# Patient Record
Sex: MALE | Race: BLACK OR AFRICAN AMERICAN | NOT HISPANIC OR LATINO | Employment: OTHER | URBAN - METROPOLITAN AREA
[De-identification: names, ages, dates, MRNs, and addresses within clinical notes are randomized per-mention and may not be internally consistent; named-entity substitution may affect disease eponyms.]

---

## 2023-05-31 ENCOUNTER — HOSPITAL ENCOUNTER (EMERGENCY)
Facility: HOSPITAL | Age: 23
Discharge: HOME | End: 2023-05-31
Attending: INTERNAL MEDICINE

## 2023-05-31 VITALS
OXYGEN SATURATION: 99 % | HEIGHT: 71 IN | BODY MASS INDEX: 28.73 KG/M2 | DIASTOLIC BLOOD PRESSURE: 88 MMHG | RESPIRATION RATE: 16 BRPM | TEMPERATURE: 99 F | HEART RATE: 98 BPM | SYSTOLIC BLOOD PRESSURE: 127 MMHG | WEIGHT: 205.25 LBS

## 2023-05-31 DIAGNOSIS — R19.7 DIARRHEA, UNSPECIFIED TYPE: ICD-10-CM

## 2023-05-31 DIAGNOSIS — R06.02 SHORTNESS OF BREATH: ICD-10-CM

## 2023-05-31 DIAGNOSIS — R10.9 ABDOMINAL PAIN, UNSPECIFIED ABDOMINAL LOCATION: ICD-10-CM

## 2023-05-31 DIAGNOSIS — R11.2 NAUSEA AND VOMITING, UNSPECIFIED VOMITING TYPE: Primary | ICD-10-CM

## 2023-05-31 LAB
ALBUMIN SERPL-MCNC: 4.5 G/DL (ref 3.5–5)
ALBUMIN/GLOB SERPL: 1.3 RATIO (ref 1.1–2)
ALP SERPL-CCNC: 56 UNIT/L (ref 40–150)
ALT SERPL-CCNC: 32 UNIT/L (ref 0–55)
AST SERPL-CCNC: 44 UNIT/L (ref 5–34)
BASOPHILS # BLD AUTO: 0.03 X10(3)/MCL
BASOPHILS NFR BLD AUTO: 0.5 %
BILIRUBIN DIRECT+TOT PNL SERPL-MCNC: 0.5 MG/DL
BUN SERPL-MCNC: 12.6 MG/DL (ref 8.9–20.6)
CALCIUM SERPL-MCNC: 9.6 MG/DL (ref 8.4–10.2)
CHLORIDE SERPL-SCNC: 101 MMOL/L (ref 98–107)
CO2 SERPL-SCNC: 26 MMOL/L (ref 22–29)
CREAT SERPL-MCNC: 1.11 MG/DL (ref 0.73–1.18)
EOSINOPHIL # BLD AUTO: 0.12 X10(3)/MCL (ref 0–0.9)
EOSINOPHIL NFR BLD AUTO: 1.9 %
ERYTHROCYTE [DISTWIDTH] IN BLOOD BY AUTOMATED COUNT: 11.6 % (ref 11.5–17)
GFR SERPLBLD CREATININE-BSD FMLA CKD-EPI: >60 MLS/MIN/1.73/M2
GLOBULIN SER-MCNC: 3.6 GM/DL (ref 2.4–3.5)
GLUCOSE SERPL-MCNC: 96 MG/DL (ref 74–100)
HCT VFR BLD AUTO: 47.5 % (ref 42–52)
HGB BLD-MCNC: 17 G/DL (ref 14–18)
IMM GRANULOCYTES # BLD AUTO: 0.02 X10(3)/MCL (ref 0–0.04)
IMM GRANULOCYTES NFR BLD AUTO: 0.3 %
LIPASE SERPL-CCNC: 6 U/L
LYMPHOCYTES # BLD AUTO: 1.86 X10(3)/MCL (ref 0.6–4.6)
LYMPHOCYTES NFR BLD AUTO: 29.9 %
MCH RBC QN AUTO: 31 PG (ref 27–31)
MCHC RBC AUTO-ENTMCNC: 35.8 G/DL (ref 33–36)
MCV RBC AUTO: 86.7 FL (ref 80–94)
MONOCYTES # BLD AUTO: 0.89 X10(3)/MCL (ref 0.1–1.3)
MONOCYTES NFR BLD AUTO: 14.3 %
NEUTROPHILS # BLD AUTO: 3.3 X10(3)/MCL (ref 2.1–9.2)
NEUTROPHILS NFR BLD AUTO: 53.1 %
NRBC BLD AUTO-RTO: 0 %
PLATELET # BLD AUTO: 205 X10(3)/MCL (ref 130–400)
PMV BLD AUTO: 9.8 FL (ref 7.4–10.4)
POTASSIUM SERPL-SCNC: 3.6 MMOL/L (ref 3.5–5.1)
PROT SERPL-MCNC: 8.1 GM/DL (ref 6.4–8.3)
RBC # BLD AUTO: 5.48 X10(6)/MCL (ref 4.7–6.1)
SODIUM SERPL-SCNC: 138 MMOL/L (ref 136–145)
WBC # SPEC AUTO: 6.22 X10(3)/MCL (ref 4.5–11.5)

## 2023-05-31 PROCEDURE — 99284 EMERGENCY DEPT VISIT MOD MDM: CPT | Mod: 25

## 2023-05-31 PROCEDURE — 25000003 PHARM REV CODE 250: Performed by: PHYSICIAN ASSISTANT

## 2023-05-31 PROCEDURE — 80053 COMPREHEN METABOLIC PANEL: CPT | Performed by: PHYSICIAN ASSISTANT

## 2023-05-31 PROCEDURE — 85025 COMPLETE CBC W/AUTO DIFF WBC: CPT | Performed by: PHYSICIAN ASSISTANT

## 2023-05-31 PROCEDURE — 83690 ASSAY OF LIPASE: CPT | Performed by: PHYSICIAN ASSISTANT

## 2023-05-31 RX ORDER — ONDANSETRON 8 MG/1
8 TABLET, ORALLY DISINTEGRATING ORAL EVERY 8 HOURS PRN
Qty: 12 TABLET | Refills: 0 | Status: SHIPPED | OUTPATIENT
Start: 2023-05-31

## 2023-05-31 RX ORDER — ONDANSETRON 4 MG/1
8 TABLET, ORALLY DISINTEGRATING ORAL
Status: COMPLETED | OUTPATIENT
Start: 2023-05-31 | End: 2023-05-31

## 2023-05-31 RX ADMIN — ONDANSETRON 8 MG: 4 TABLET, ORALLY DISINTEGRATING ORAL at 05:05

## 2023-05-31 NOTE — ED PROVIDER NOTES
Encounter Date: 5/31/2023       History     Chief Complaint   Patient presents with    Shortness of Breath    Cough     Shortness of breath x 4 days with cough today     21 YO AAM in ER with complaints of abdominal cramping, N/V/D (non bloody), weight loss and feeling SOB X 4 days. Denies fever, chills, chest pain,  HA or dizziness. No other complaints.     The history is provided by the patient.   Review of patient's allergies indicates:  No Known Allergies  History reviewed. No pertinent past medical history.  History reviewed. No pertinent surgical history.  History reviewed. No pertinent family history.  Social History     Tobacco Use    Smoking status: Some Days     Types: Cigarettes    Smokeless tobacco: Never   Substance Use Topics    Drug use: Yes     Frequency: 7.0 times per week     Types: Marijuana     Review of Systems   Constitutional:  Positive for appetite change and unexpected weight change. Negative for chills and fever.   HENT:  Negative for congestion and trouble swallowing.    Respiratory:  Positive for shortness of breath. Negative for cough, chest tightness and wheezing.    Cardiovascular:  Negative for chest pain and leg swelling.   Gastrointestinal:  Positive for abdominal pain, diarrhea, nausea and vomiting. Negative for blood in stool.   Musculoskeletal:  Negative for joint swelling.   Skin:  Negative for rash.   Neurological:  Negative for dizziness, weakness and headaches.   All other systems reviewed and are negative.    Physical Exam     Initial Vitals [05/31/23 1627]   BP Pulse Resp Temp SpO2   124/87 103 18 98.8 °F (37.1 °C) 100 %      MAP       --         Physical Exam    Nursing note and vitals reviewed.  Constitutional: He appears well-developed and well-nourished.   HENT:   Head: Normocephalic and atraumatic.   Nose: Nose normal.   Eyes: Conjunctivae are normal.   Neck: Neck supple.   Normal range of motion.  Cardiovascular:  Normal rate and regular rhythm.            Pulmonary/Chest: Breath sounds normal. No respiratory distress. He has no wheezes. He has no rhonchi.   Abdominal: Abdomen is soft and flat. Bowel sounds are increased. There is generalized abdominal tenderness. There is no rebound, no guarding, no tenderness at McBurney's point and negative King's sign.   Musculoskeletal:         General: Normal range of motion.      Cervical back: Normal range of motion and neck supple.     Neurological: He is alert and oriented to person, place, and time.   Skin: Skin is dry.   Psychiatric: He has a normal mood and affect.       ED Course   Procedures  Labs Reviewed   COMPREHENSIVE METABOLIC PANEL - Abnormal; Notable for the following components:       Result Value    Globulin 3.6 (*)     Aspartate Aminotransferase 44 (*)     All other components within normal limits   LIPASE - Normal   CBC W/ AUTO DIFFERENTIAL    Narrative:     The following orders were created for panel order CBC auto differential.  Procedure                               Abnormality         Status                     ---------                               -----------         ------                     CBC with Differential[[id number]]                            Final result                 Please view results for these tests on the individual orders.   CBC WITH DIFFERENTIAL   URINALYSIS, REFLEX TO URINE CULTURE   EXTRA TUBES    Narrative:     The following orders were created for panel order EXTRA TUBES.  Procedure                               Abnormality         Status                     ---------                               -----------         ------                     Light Blue Top Hold[[id number]]                                                         Lavender Top Hold[[id number]]                                                           Gold Top Hold[[id number]]                                                                 Please view results for these tests on the individual orders.   LIGHT  BLUE TOP HOLD   LAVENDER TOP HOLD   GOLD TOP HOLD          Imaging Results              X-Ray Abdomen Flat And Erect (Final result)  Result time 05/31/23 18:32:23      Final result by Nithya Betancourt MD (05/31/23 18:32:23)                   Impression:      No acute abnormality identified.      Electronically signed by: Nithya Betancourt  Date:    05/31/2023  Time:    18:32               Narrative:    EXAMINATION:  XR ABDOMEN FLAT AND ERECT    CLINICAL HISTORY:  Pain, unspecified    COMPARISON:  None.    FINDINGS:  There is no free intraperitoneal air.  The bowel gas pattern is nonobstructive.  The lung bases are clear.                                       X-Ray Chest AP Portable (Final result)  Result time 05/31/23 18:31:57      Final result by Nithya Betancourt MD (05/31/23 18:31:57)                   Impression:      No acute abnormality of the chest.      Electronically signed by: Nithya Betancourt  Date:    05/31/2023  Time:    18:31               Narrative:    EXAMINATION:  XR CHEST AP PORTABLE    CLINICAL HISTORY:  Shortness of breath    COMPARISON:  None    FINDINGS:  The heart is normal in size.  The lungs are clear.  There is no pleural effusion or visible pneumothorax.                                       Medications   ondansetron disintegrating tablet 8 mg (8 mg Oral Given 5/31/23 1720)                 ED Course as of 05/31/23 1842   Wed May 31, 2023   1839 VSS, NAD, pt is non-toxic or ill appearing, labs and imaging reviewed with pt, feeling better after treatment in ER, tolerated PO challenge, will Rx Zofran for home use, return to ER precautions given, treatment plan and discharge instructions including follow up discussed, pt verbalized understanding, all questions answered, pt is stable and ready for discharge  [TT]      ED Course User Index  [TT] LEXI Willams                 Clinical Impression:   Final diagnoses:  [R06.02] Shortness of breath  [R11.2] Nausea and vomiting, unspecified  vomiting type (Primary)  [R19.7] Diarrhea, unspecified type  [R10.9] Abdominal pain, unspecified abdominal location        ED Disposition Condition    Discharge Stable          ED Prescriptions       Medication Sig Dispense Start Date End Date Auth. Provider    ondansetron (ZOFRAN-ODT) 8 MG TbDL Take 1 tablet (8 mg total) by mouth every 8 (eight) hours as needed (nausea). Allow to dissolve on the tongue, do not chew or swallow 12 tablet 5/31/2023 -- LEXI Willams          Follow-up Information       Follow up With Specialties Details Why Contact Info [facility] Emergency Medicine In 3 days As needed, If symptoms worsen [address] [facility] [address] [phone]    Primary Care Provider  Schedule an appointment as soon as possible for a visit in 5 days  Call a PCP to make an appointment for follow up within 3-5  days.  You can call the phone number on the back of your insurance card for accepting providers as discussed.             LEXI Willams  05/31/23 1877

## 2023-05-31 NOTE — DISCHARGE INSTRUCTIONS
Take all medications as prescribed.     Drink plenty of fluids and get a lot of rest.     [place] diet for the next 24 -48 hours then you may advance your diet as tolerated.    Return to ER for any changes or worsening of symptoms.

## 2024-03-25 ENCOUNTER — HOSPITAL ENCOUNTER (EMERGENCY)
Facility: HOSPITAL | Age: 24
Discharge: LEFT BEFORE DISCHARGE | End: 2024-03-25
Payer: COMMERCIAL

## 2024-03-25 VITALS
BODY MASS INDEX: 32.9 KG/M2 | WEIGHT: 235 LBS | TEMPERATURE: 98 F | HEIGHT: 71 IN | DIASTOLIC BLOOD PRESSURE: 78 MMHG | RESPIRATION RATE: 16 BRPM | HEART RATE: 80 BPM | SYSTOLIC BLOOD PRESSURE: 122 MMHG | OXYGEN SATURATION: 98 %

## 2024-03-25 PROCEDURE — 99283 EMERGENCY DEPT VISIT LOW MDM: CPT
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
## 2024-12-05 ENCOUNTER — HOSPITAL ENCOUNTER (EMERGENCY)
Facility: HOSPITAL | Age: 24
Discharge: HOME | End: 2024-12-05
Attending: STUDENT IN AN ORGANIZED HEALTH CARE EDUCATION/TRAINING PROGRAM

## 2024-12-05 VITALS
WEIGHT: 225.06 LBS | BODY MASS INDEX: 32.22 KG/M2 | RESPIRATION RATE: 20 BRPM | TEMPERATURE: 97 F | OXYGEN SATURATION: 99 % | HEART RATE: 90 BPM | HEIGHT: 70 IN | SYSTOLIC BLOOD PRESSURE: 162 MMHG | DIASTOLIC BLOOD PRESSURE: 113 MMHG

## 2024-12-05 DIAGNOSIS — R36.9 PENILE DISCHARGE: Primary | ICD-10-CM

## 2024-12-05 DIAGNOSIS — Z20.2 STD EXPOSURE: ICD-10-CM

## 2024-12-05 PROCEDURE — 96372 THER/PROPH/DIAG INJ SC/IM: CPT

## 2024-12-05 PROCEDURE — 63600175 PHARM REV CODE 636 W HCPCS

## 2024-12-05 PROCEDURE — 99284 EMERGENCY DEPT VISIT MOD MDM: CPT | Mod: 25

## 2024-12-05 RX ORDER — CEFTRIAXONE 500 MG/1
1000 INJECTION, POWDER, FOR SOLUTION INTRAMUSCULAR; INTRAVENOUS
Status: COMPLETED | OUTPATIENT
Start: 2024-12-05 | End: 2024-12-05

## 2024-12-05 RX ORDER — DOXYCYCLINE 100 MG/1
100 CAPSULE ORAL 2 TIMES DAILY
Qty: 14 CAPSULE | Refills: 0 | Status: SHIPPED | OUTPATIENT
Start: 2024-12-05 | End: 2024-12-12

## 2024-12-05 RX ADMIN — CEFTRIAXONE SODIUM 1000 MG: 500 INJECTION, POWDER, FOR SOLUTION INTRAMUSCULAR; INTRAVENOUS at 08:12

## 2024-12-06 NOTE — ED PROVIDER NOTES
Encounter Date: 12/5/2024       History     Chief Complaint   Patient presents with    Penile Discharge     23 y/o male presents to the ED complaining of white to yellow penile discharge for a few days. He is sexually active with several partners, he does not use a barrier method, he was told by one partner she tested positive for chlamydia. He denies burning with urination, abdominal pain, nausea, fever, rashes, testicle pain/swelling. No other complaints    The history is provided by the patient.     Review of patient's allergies indicates:  No Known Allergies  History reviewed. No pertinent past medical history.  History reviewed. No pertinent surgical history.  No family history on file.  Social History     Tobacco Use    Smoking status: Some Days     Types: Cigarettes    Smokeless tobacco: Never   Substance Use Topics    Drug use: Yes     Frequency: 7.0 times per week     Types: Marijuana     Review of Systems   Constitutional: Negative.    HENT: Negative.     Eyes: Negative.    Respiratory: Negative.     Cardiovascular: Negative.    Gastrointestinal: Negative.    Genitourinary:  Positive for penile discharge. Negative for decreased urine volume, difficulty urinating, dysuria, enuresis, flank pain, frequency, genital sores, hematuria, penile pain, penile swelling, scrotal swelling, testicular pain and urgency.        Notified his partner is positive for chlamydia, reports penile discharge yellow to white for a few days. Denies fever, scrotal pain, abdominal pain, dysuria, nausea, vomiting, weakness, fatigue, wounds, rashes   Musculoskeletal: Negative.    Skin: Negative.    Neurological: Negative.        Physical Exam     Initial Vitals [12/05/24 2007]   BP Pulse Resp Temp SpO2   (!) 162/113 90 20 97.2 °F (36.2 °C) 99 %      MAP       --         Physical Exam    Nursing note and vitals reviewed.  Constitutional: He appears well-developed and well-nourished. He is cooperative.  Non-toxic appearance. He does not  have a sickly appearance. He does not appear ill. No distress.   HENT:   Head: Normocephalic and atraumatic.   Right Ear: Hearing, tympanic membrane and external ear normal.   Left Ear: Hearing, tympanic membrane and external ear normal.   Nose: Nose normal. Mouth/Throat: Uvula is midline, oropharynx is clear and moist and mucous membranes are normal.   Eyes: Conjunctivae and EOM are normal. Pupils are equal, round, and reactive to light. No scleral icterus.   Neck: Trachea normal and phonation normal. Neck supple.   Normal range of motion.  Cardiovascular:  Normal rate, regular rhythm, normal heart sounds, intact distal pulses and normal pulses.           Pulmonary/Chest: Effort normal and breath sounds normal. No accessory muscle usage. No apnea, no tachypnea and no bradypnea. No respiratory distress. He has no decreased breath sounds. He has no wheezes. He has no rhonchi. He has no rales. He exhibits no tenderness.   Normal effort, symmetrical chest rise and fall, no accessory muscle use. Bilateral clear breath sounds in all fields anterior and posterior.      Abdominal: Abdomen is soft. Bowel sounds are normal. He exhibits no distension. There is no abdominal tenderness.   Abdomen is soft, nontender, no peritoneal signs. Tolerating PO fluids.      No right CVA tenderness.  No left CVA tenderness. There is no rebound, no guarding, no tenderness at McBurney's point and negative King's sign. negative psoas sign and negative Rovsing's sign  Genitourinary:    Genitourinary Comments: Declined exam     Musculoskeletal:         General: Normal range of motion.      Cervical back: Normal range of motion and neck supple.     Neurological: He is alert and oriented to person, place, and time. He has normal strength. Gait normal. GCS score is 15. GCS eye subscore is 4. GCS verbal subscore is 5. GCS motor subscore is 6.   Skin: Skin is warm and dry. Capillary refill takes less than 2 seconds. No rash noted.   Psychiatric:  He has a normal mood and affect. His speech is normal and behavior is normal. Judgment and thought content normal. Cognition and memory are normal.         ED Course   Procedures  Labs Reviewed - No data to display       Imaging Results    None          Medications   cefTRIAXone injection 1,000 mg (1,000 mg Intramuscular Given 12/5/24 2022)     Medical Decision Making  Urethritis, cystitis, pyelonephritis, urethral stone, syphilis, herpes, malignancy, cellulitis, abscess, among others    Penile discharge for a few days with one partner testing positive for chlamydia. Requesting to be treated for both chlamydia and gonorrhea. Educated to go to the health unit to get tested for HIV, Hepatitis, and Syphilis. Educated on sexual safe practices and barrier methods.    Patient is nontoxic appearing, no acute distress, stable vital signs.   The patient is resting comfortably in no acute distress.  hemodynamically stable and is without objective evidence for acute process requiring urgent intervention or hospitalization. I provided counseling to patient with regard to condition, the treatment plan, specific conditions for return, and the importance of follow up. Detailed written and verbal instructions provided to patient and he expressed a verbal understanding of the discharge instructions and overall management plan. Reiterated the importance of medication administration and safety and advised patient to follow up with primary care provider in 3-5 days or sooner if needed. Answered questions at this time. The patient is stable for discharge.       Risk  Prescription drug management.                                      Clinical Impression:  Final diagnoses:  [R36.9] Penile discharge (Primary)  [Z20.2] STD exposure          ED Disposition Condition    Discharge Stable          ED Prescriptions       Medication Sig Dispense Start Date End Date Auth. Provider    doxycycline (VIBRAMYCIN) 100 MG Cap Take 1 capsule (100 mg  total) by mouth 2 (two) times daily. for 7 days 14 capsule 12/5/2024 12/12/2024 Christal Hatfield FNP          Follow-up Information       Follow up With Specialties Details Why Contact Info    [facility] Emergency Medicine  If symptoms worsen [address] [facility] [address] [phone]    PCP  In 1 week  Follow up with PCP in 3-5 days.             Christal Hatfield FNP  12/05/24 2031

## 2025-01-11 ENCOUNTER — HOSPITAL ENCOUNTER (EMERGENCY)
Facility: HOSPITAL | Age: 25
Discharge: HOME | End: 2025-01-11
Attending: EMERGENCY MEDICINE

## 2025-01-11 VITALS
BODY MASS INDEX: 31.52 KG/M2 | TEMPERATURE: 98 F | HEIGHT: 71 IN | WEIGHT: 225.19 LBS | RESPIRATION RATE: 18 BRPM | SYSTOLIC BLOOD PRESSURE: 117 MMHG | HEART RATE: 79 BPM | OXYGEN SATURATION: 99 % | DIASTOLIC BLOOD PRESSURE: 62 MMHG

## 2025-01-11 DIAGNOSIS — Z20.2 STD EXPOSURE: Primary | ICD-10-CM

## 2025-01-11 PROCEDURE — 99283 EMERGENCY DEPT VISIT LOW MDM: CPT

## 2025-01-11 RX ORDER — DOXYCYCLINE 100 MG/1
100 CAPSULE ORAL 2 TIMES DAILY
Qty: 20 CAPSULE | Refills: 0 | Status: SHIPPED | OUTPATIENT
Start: 2025-01-11 | End: 2025-01-21

## 2025-01-11 NOTE — ED PROVIDER NOTES
Encounter Date: 1/11/2025       History     Chief Complaint   Patient presents with    Exposure to STD     Reports exposed to chlamydia x2 weeks ago. Reports penile discharge. Denies dysuria.     Jesus Manuel Lucas is a 24 y.o. male who presents to the ED with complaints of urethral discharge with exposure to chlamydia  that started 2 week(s) ago. States his partner is currently being treated.       The history is provided by the patient. No  was used.     Review of patient's allergies indicates:  No Known Allergies  No past medical history on file.  No past surgical history on file.  No family history on file.  Social History     Tobacco Use    Smoking status: Some Days     Types: Cigarettes    Smokeless tobacco: Never   Substance Use Topics    Drug use: Yes     Frequency: 7.0 times per week     Types: Marijuana     Review of Systems   Constitutional:  Negative for chills, fatigue and fever.   HENT:  Negative for congestion, ear pain, sinus pain and sore throat.    Eyes:  Negative for pain.   Respiratory:  Negative for cough, chest tightness and shortness of breath.    Cardiovascular:  Negative for chest pain.   Gastrointestinal:  Negative for abdominal pain, constipation, diarrhea, nausea and vomiting.   Genitourinary:  Positive for dysuria. Negative for flank pain, hematuria, testicular pain and urgency.   Musculoskeletal:  Negative for back pain and joint swelling.   Skin:  Negative for color change and rash.   Neurological:  Negative for dizziness and weakness.   Psychiatric/Behavioral:  Negative for behavioral problems and confusion.        Physical Exam     Initial Vitals [01/11/25 1049]   BP Pulse Resp Temp SpO2   117/62 79 18 98.2 °F (36.8 °C) 99 %      MAP       --         Physical Exam    Nursing note and vitals reviewed.  Constitutional: He appears well-developed and well-nourished.   HENT:   Head: Normocephalic and atraumatic.   Right Ear: External ear normal.   Left Ear: External ear  normal.   Nose: Nose normal.   Eyes: Conjunctivae and EOM are normal.   Neck: Neck supple. No thyromegaly present. No tracheal deviation present.   Cardiovascular:  Normal rate, regular rhythm and normal heart sounds.     Exam reveals no gallop and no friction rub.       No murmur heard.  Pulmonary/Chest: Breath sounds normal. No respiratory distress. He has no wheezes. He has no rhonchi. He has no rales. He exhibits no tenderness.   Abdominal: Abdomen is soft. He exhibits no distension.   Genitourinary:    Genitourinary Comments: Declined exam     Musculoskeletal:      Cervical back: Neck supple.     Neurological: He is alert and oriented to person, place, and time. GCS score is 15. GCS eye subscore is 4. GCS verbal subscore is 5. GCS motor subscore is 6.   Skin: Skin is warm. Capillary refill takes less than 2 seconds. No rash and no abscess noted. No erythema. No pallor.   Psychiatric: He has a normal mood and affect.         ED Course   Procedures  Labs Reviewed - No data to display       Imaging Results    None          Medications - No data to display  Medical Decision Making  DDX: STD, urethritis, UTI, among others    Jesus Manuel Lucas is a 24 y.o. male who presents to the ED with complaints of urethral discharge with exposure to chlamydia  that started 2 week(s) ago. States his partner is currently being treated.     Hospital Course: Offered STD testing, patient declined. Just wants to be treated. Will DC home with Doxycycline and instructed him to follow up with Health Unit. Strict return precautions given. Stable for discharge.                                       Clinical Impression:  Final diagnoses:  [Z20.2] STD exposure (Primary)          ED Disposition Condition    Discharge Stable          ED Prescriptions       Medication Sig Dispense Start Date End Date Auth. Provider    doxycycline (VIBRAMYCIN) 100 MG Cap Take 1 capsule (100 mg total) by mouth 2 (two) times daily. for 10 days 20 capsule 1/11/2025  1/21/2025 Taniya Bob PA-C          Follow-up Information       Follow up With Specialties Details Why Contact Info    [facility]  In 1 week  [address] [facility] [id number]    [facility] Emergency Medicine In 3 days As needed, If symptoms worsen [address] [facility] [address] [phone]             Taniya Bob PA-C  01/11/25 1112

## 2025-03-12 ENCOUNTER — HOSPITAL ENCOUNTER (EMERGENCY)
Facility: HOSPITAL | Age: 25
Discharge: HOME | End: 2025-03-12
Attending: INTERNAL MEDICINE

## 2025-03-12 VITALS
BODY MASS INDEX: 29.93 KG/M2 | OXYGEN SATURATION: 98 % | DIASTOLIC BLOOD PRESSURE: 66 MMHG | WEIGHT: 221 LBS | TEMPERATURE: 98 F | HEART RATE: 87 BPM | RESPIRATION RATE: 17 BRPM | HEIGHT: 72 IN | SYSTOLIC BLOOD PRESSURE: 110 MMHG

## 2025-03-12 DIAGNOSIS — Z20.2 EXPOSURE TO CHLAMYDIA: Primary | ICD-10-CM

## 2025-03-12 PROCEDURE — 99283 EMERGENCY DEPT VISIT LOW MDM: CPT

## 2025-03-12 RX ORDER — DOXYCYCLINE 100 MG/1
100 CAPSULE ORAL EVERY 12 HOURS
Qty: 14 CAPSULE | Refills: 0 | Status: SHIPPED | OUTPATIENT
Start: 2025-03-12 | End: 2025-03-19

## 2025-03-13 NOTE — ED PROVIDER NOTES
Encounter Date: 3/12/2025       History     Chief Complaint   Patient presents with    Exposure to STD     Recent exposure to Chlamydia. Denies discharge or dysuria at present     He reports that his wife tested positive for chlamydia today and he requests treatment. He is asymptomatic.      Review of patient's allergies indicates:  No Known Allergies  History reviewed. No pertinent past medical history.  History reviewed. No pertinent surgical history.  No family history on file.  Social History[1]  Review of Systems   Constitutional:  Negative for fever.   HENT:  Negative for sore throat.    Respiratory:  Negative for shortness of breath.    Cardiovascular:  Negative for chest pain.   Gastrointestinal:  Negative for nausea.   Genitourinary:  Negative for dysuria.   Musculoskeletal:  Negative for back pain.   Skin:  Negative for rash.   Neurological:  Negative for weakness.   Hematological:  Does not bruise/bleed easily.   All other systems reviewed and are negative.      Physical Exam     Initial Vitals [03/12/25 2111]   BP Pulse Resp Temp SpO2   110/66 87 17 97.7 °F (36.5 °C) 98 %      MAP       --         Physical Exam    Nursing note and vitals reviewed.  Constitutional: He appears well-developed and well-nourished.   HENT:   Head: Normocephalic and atraumatic.   Neck: Neck supple.   Normal range of motion.  Cardiovascular:  Normal rate, regular rhythm, normal heart sounds and intact distal pulses.           Pulmonary/Chest: Effort normal and breath sounds normal. He has no decreased breath sounds.   Abdominal: Abdomen is soft and flat. Bowel sounds are normal. There is no abdominal tenderness.   Genitourinary:    Testes and penis normal.   Cremasteric reflex is present. Circumcised.   Musculoskeletal:         General: Normal range of motion.      Cervical back: Normal range of motion and neck supple.     Neurological: He is alert and oriented to person, place, and time. He has normal strength.   Skin: Skin is  warm and dry.   Psychiatric: He has a normal mood and affect.         ED Course   Procedures  Labs Reviewed - No data to display       Imaging Results    None          Medications - No data to display  Medical Decision Making  He reports that his wife tested positive for chlamydia today and he requests treatment. He is asymptomatic.    Will treat for chlamydia exposure. He is advised to f/u with his pcp and public health unit.9:32 PM DISPOSITION: The patient is resting comfortably in no acute distress.  He is hemodynamically stable and is without objective evidence for acute process requiring urgent intervention or hospitalization. I provided counseling to patient with regard to condition, the treatment plan, specific conditions for return, and the importance of follow up. Detailed written and verbal instructions provided to patient and he expressed a verbal understanding of the discharge instructions and overall management plan. Reiterated the importance of medication administration and safety and advised patient to follow up with primary care provider in 3-5 days or sooner if needed.  Answered questions at this time. The patient is stable for discharge.         Additional MDM:   Differential Diagnosis:   At this time differential diagnosis is but not limited to gonorrhea, chlamydia, trichomoniasis, uti                   ED Course as of 03/12/25 2133   Wed Mar 12, 2025   2131 Given strict ED return precautions. I have spoken with the patient and/or caregivers. I have explained the patient's condition, diagnoses and treatment plan based on the information available to me at this time. I have answered the patient's and/or caregiver's questions and addressed any concerns. The patient and/or caregivers have as good an understanding of the patient's diagnosis, condition and treatment plan as can be expected at this point. The vital signs have been stable. The patient's condition is stable and appropriate for discharge  from the emergency department.      The patient will pursue further outpatient evaluation with the primary care physician or other designated or consulting physician as outlined in the discharge instructions. The patient and/or caregivers are agreeable to this plan of care and follow-up instructions have been explained in detail. The patient and/or caregivers have received these instructions in written format and have expressed an understanding of the discharge instructions. The patient and/or caregivers are aware that any significant change in condition or worsening of symptoms should prompt an immediate return to this or the closest emergency department or a call to 911.      [RB]      ED Course User Index  [RB] Dakota Duron ACNP                           Clinical Impression:  Final diagnoses:  [Z20.2] Exposure to chlamydia (Primary)          ED Disposition Condition    Discharge Stable          ED Prescriptions       Medication Sig Dispense Start Date End Date Auth. Provider    doxycycline (VIBRAMYCIN) 100 MG Cap Take 1 capsule (100 mg total) by mouth every 12 (twelve) hours. for 7 days 14 capsule 3/12/2025 3/19/2025 Dakota Duron ACNP          Follow-up Information       Follow up With Specialties Details Why Contact Info    [facility]   follow up in 3-5 days [address] [place] [address]. [phone]    follow up with your primary care provider in 3-5 days        [facility] Emergency Medicine  If symptoms worsen [address] [address] [phone]               [1]   Social History  Tobacco Use    Smoking status: Some Days     Types: Cigarettes    Smokeless tobacco: Never   Substance Use Topics    Drug use: Yes     Frequency: 7.0 times per week     Types: Marijuana        Dakota Duron ACNP  03/12/25 9639

## 2025-04-08 ENCOUNTER — HOSPITAL ENCOUNTER (EMERGENCY)
Facility: HOSPITAL | Age: 25
Discharge: PSYCHIATRIC OR SUBSTANCE USE FACILITY | End: 2025-04-08
Attending: EMERGENCY MEDICINE

## 2025-04-08 ENCOUNTER — HOSPITAL ENCOUNTER (INPATIENT)
Facility: HOSPITAL | Age: 25
LOS: 4 days | Discharge: HOME | DRG: 885 | End: 2025-04-12
Attending: PSYCHIATRY & NEUROLOGY | Admitting: PSYCHIATRY & NEUROLOGY

## 2025-04-08 VITALS
WEIGHT: 235 LBS | DIASTOLIC BLOOD PRESSURE: 83 MMHG | RESPIRATION RATE: 20 BRPM | HEIGHT: 71 IN | HEART RATE: 72 BPM | BODY MASS INDEX: 32.9 KG/M2 | SYSTOLIC BLOOD PRESSURE: 133 MMHG | TEMPERATURE: 97 F | OXYGEN SATURATION: 100 %

## 2025-04-08 DIAGNOSIS — F32.A DEPRESSION: ICD-10-CM

## 2025-04-08 DIAGNOSIS — R45.89 SUICIDAL BEHAVIOR WITHOUT ATTEMPTED SELF-INJURY: Primary | ICD-10-CM

## 2025-04-08 DIAGNOSIS — R45.851 DEPRESSION WITH SUICIDAL IDEATION: ICD-10-CM

## 2025-04-08 DIAGNOSIS — F32.A DEPRESSION WITH SUICIDAL IDEATION: ICD-10-CM

## 2025-04-08 LAB
ACCEPTIBLE SP GR UR QL: 1.02 (ref 1–1.03)
ALBUMIN SERPL-MCNC: 4.2 G/DL (ref 3.5–5)
ALBUMIN/GLOB SERPL: 1.1 RATIO (ref 1.1–2)
ALP SERPL-CCNC: 69 UNIT/L (ref 40–150)
ALT SERPL-CCNC: 28 UNIT/L (ref 0–55)
AMPHET UR QL SCN: NEGATIVE
ANION GAP SERPL CALC-SCNC: 11 MEQ/L
APAP SERPL-MCNC: <3 UG/ML (ref 10–30)
AST SERPL-CCNC: 21 UNIT/L (ref 11–45)
BACTERIA #/AREA URNS AUTO: ABNORMAL /HPF
BARBITURATE SCN PRESENT UR: NEGATIVE
BASOPHILS # BLD AUTO: 0.01 X10(3)/MCL
BASOPHILS NFR BLD AUTO: 0.2 %
BENZODIAZ UR QL SCN: NEGATIVE
BILIRUB SERPL-MCNC: 0.2 MG/DL
BILIRUB UR QL STRIP.AUTO: NEGATIVE
BUN SERPL-MCNC: 13.2 MG/DL (ref 8.9–20.6)
CALCIUM SERPL-MCNC: 9 MG/DL (ref 8.4–10.2)
CANNABINOIDS UR QL SCN: POSITIVE
CHLORIDE SERPL-SCNC: 105 MMOL/L (ref 98–107)
CLARITY UR: CLEAR
CO2 SERPL-SCNC: 22 MMOL/L (ref 22–29)
COCAINE UR QL SCN: NEGATIVE
COLOR UR AUTO: ABNORMAL
CREAT SERPL-MCNC: 1.14 MG/DL (ref 0.72–1.25)
CREAT/UREA NIT SERPL: 12
EOSINOPHIL # BLD AUTO: 0.16 X10(3)/MCL (ref 0–0.9)
EOSINOPHIL NFR BLD AUTO: 2.6 %
ERYTHROCYTE [DISTWIDTH] IN BLOOD BY AUTOMATED COUNT: 12.2 % (ref 11.5–17)
ETHANOL SERPL-MCNC: <10 MG/DL
FENTANYL UR QL SCN: NEGATIVE
GFR SERPLBLD CREATININE-BSD FMLA CKD-EPI: >60 ML/MIN/1.73/M2
GLOBULIN SER-MCNC: 3.7 GM/DL (ref 2.4–3.5)
GLUCOSE SERPL-MCNC: 103 MG/DL (ref 74–100)
GLUCOSE UR QL STRIP: NORMAL
HCT VFR BLD AUTO: 42.3 % (ref 42–52)
HGB BLD-MCNC: 14.6 G/DL (ref 14–18)
HGB UR QL STRIP: ABNORMAL
IMM GRANULOCYTES # BLD AUTO: 0.02 X10(3)/MCL (ref 0–0.04)
IMM GRANULOCYTES NFR BLD AUTO: 0.3 %
KETONES UR QL STRIP: NEGATIVE
LEUKOCYTE ESTERASE UR QL STRIP: NEGATIVE
LYMPHOCYTES # BLD AUTO: 1.96 X10(3)/MCL (ref 0.6–4.6)
LYMPHOCYTES NFR BLD AUTO: 31.8 %
MCH RBC QN AUTO: 31.9 PG (ref 27–31)
MCHC RBC AUTO-ENTMCNC: 34.5 G/DL (ref 33–36)
MCV RBC AUTO: 92.4 FL (ref 80–94)
MDMA UR QL SCN: NEGATIVE
MONOCYTES # BLD AUTO: 0.69 X10(3)/MCL (ref 0.1–1.3)
MONOCYTES NFR BLD AUTO: 11.2 %
MUCOUS THREADS URNS QL MICRO: ABNORMAL /LPF
NEUTROPHILS # BLD AUTO: 3.32 X10(3)/MCL (ref 2.1–9.2)
NEUTROPHILS NFR BLD AUTO: 53.9 %
NITRITE UR QL STRIP: NEGATIVE
NRBC BLD AUTO-RTO: 0 %
OPIATES UR QL SCN: NEGATIVE
PCP UR QL: NEGATIVE
PH UR STRIP: 6 [PH]
PH UR: 6 [PH] (ref 3–11)
PLATELET # BLD AUTO: 227 X10(3)/MCL (ref 130–400)
PMV BLD AUTO: 9.8 FL (ref 7.4–10.4)
POTASSIUM SERPL-SCNC: 3.6 MMOL/L (ref 3.5–5.1)
PROT SERPL-MCNC: 7.9 GM/DL (ref 6.4–8.3)
PROT UR QL STRIP: NEGATIVE
RBC # BLD AUTO: 4.58 X10(6)/MCL (ref 4.7–6.1)
RBC #/AREA URNS AUTO: ABNORMAL /HPF
SODIUM SERPL-SCNC: 138 MMOL/L (ref 136–145)
SP GR UR STRIP.AUTO: 1.02 (ref 1–1.03)
SQUAMOUS #/AREA URNS LPF: ABNORMAL /HPF
UROBILINOGEN UR STRIP-ACNC: NORMAL
WBC # BLD AUTO: 6.16 X10(3)/MCL (ref 4.5–11.5)
WBC #/AREA URNS AUTO: ABNORMAL /HPF

## 2025-04-08 PROCEDURE — 85025 COMPLETE CBC W/AUTO DIFF WBC: CPT | Performed by: EMERGENCY MEDICINE

## 2025-04-08 PROCEDURE — 80307 DRUG TEST PRSMV CHEM ANLYZR: CPT | Performed by: EMERGENCY MEDICINE

## 2025-04-08 PROCEDURE — 82077 ASSAY SPEC XCP UR&BREATH IA: CPT | Performed by: EMERGENCY MEDICINE

## 2025-04-08 PROCEDURE — 80143 DRUG ASSAY ACETAMINOPHEN: CPT | Performed by: EMERGENCY MEDICINE

## 2025-04-08 PROCEDURE — 81001 URINALYSIS AUTO W/SCOPE: CPT | Performed by: EMERGENCY MEDICINE

## 2025-04-08 PROCEDURE — 80053 COMPREHEN METABOLIC PANEL: CPT | Performed by: EMERGENCY MEDICINE

## 2025-04-08 PROCEDURE — 11400000 HC PSYCH PRIVATE ROOM

## 2025-04-08 PROCEDURE — 99285 EMERGENCY DEPT VISIT HI MDM: CPT

## 2025-04-08 RX ORDER — DIPHENHYDRAMINE HYDROCHLORIDE 50 MG/ML
50 INJECTION, SOLUTION INTRAMUSCULAR; INTRAVENOUS
Status: DISCONTINUED | OUTPATIENT
Start: 2025-04-08 | End: 2025-04-08 | Stop reason: HOSPADM

## 2025-04-08 RX ORDER — HALOPERIDOL 5 MG/1
5 TABLET ORAL EVERY 6 HOURS PRN
Status: DISCONTINUED | OUTPATIENT
Start: 2025-04-08 | End: 2025-04-12 | Stop reason: HOSPADM

## 2025-04-08 RX ORDER — DIPHENHYDRAMINE HCL 25 MG
50 CAPSULE ORAL EVERY 6 HOURS PRN
Status: DISCONTINUED | OUTPATIENT
Start: 2025-04-08 | End: 2025-04-12 | Stop reason: HOSPADM

## 2025-04-08 RX ORDER — HALOPERIDOL LACTATE 5 MG/ML
5 INJECTION, SOLUTION INTRAMUSCULAR EVERY 6 HOURS PRN
Status: DISCONTINUED | OUTPATIENT
Start: 2025-04-08 | End: 2025-04-12 | Stop reason: HOSPADM

## 2025-04-08 RX ORDER — HYDROXYZINE HYDROCHLORIDE 50 MG/1
50 TABLET, FILM COATED ORAL EVERY 4 HOURS PRN
Status: DISCONTINUED | OUTPATIENT
Start: 2025-04-08 | End: 2025-04-12 | Stop reason: HOSPADM

## 2025-04-08 RX ORDER — IBUPROFEN 200 MG
1 TABLET ORAL DAILY
Status: DISCONTINUED | OUTPATIENT
Start: 2025-04-08 | End: 2025-04-12 | Stop reason: HOSPADM

## 2025-04-08 RX ORDER — DIPHENHYDRAMINE HYDROCHLORIDE 50 MG/ML
50 INJECTION, SOLUTION INTRAMUSCULAR; INTRAVENOUS EVERY 6 HOURS PRN
Status: DISCONTINUED | OUTPATIENT
Start: 2025-04-08 | End: 2025-04-12 | Stop reason: HOSPADM

## 2025-04-08 RX ORDER — LORAZEPAM 2 MG/ML
2 INJECTION INTRAMUSCULAR
Status: DISCONTINUED | OUTPATIENT
Start: 2025-04-08 | End: 2025-04-08 | Stop reason: HOSPADM

## 2025-04-08 RX ORDER — ACETAMINOPHEN 325 MG/1
650 TABLET ORAL EVERY 6 HOURS PRN
Status: DISCONTINUED | OUTPATIENT
Start: 2025-04-08 | End: 2025-04-12 | Stop reason: HOSPADM

## 2025-04-08 RX ORDER — LORAZEPAM 2 MG/ML
2 INJECTION INTRAMUSCULAR EVERY 6 HOURS PRN
Status: DISCONTINUED | OUTPATIENT
Start: 2025-04-08 | End: 2025-04-12 | Stop reason: HOSPADM

## 2025-04-08 RX ORDER — LORAZEPAM 1 MG/1
2 TABLET ORAL EVERY 6 HOURS PRN
Status: DISCONTINUED | OUTPATIENT
Start: 2025-04-08 | End: 2025-04-12 | Stop reason: HOSPADM

## 2025-04-08 RX ORDER — HALOPERIDOL LACTATE 5 MG/ML
5 INJECTION, SOLUTION INTRAMUSCULAR
Status: DISCONTINUED | OUTPATIENT
Start: 2025-04-08 | End: 2025-04-08 | Stop reason: HOSPADM

## 2025-04-08 RX ORDER — ALUMINUM HYDROXIDE, MAGNESIUM HYDROXIDE, AND SIMETHICONE 1200; 120; 1200 MG/30ML; MG/30ML; MG/30ML
30 SUSPENSION ORAL EVERY 6 HOURS PRN
Status: DISCONTINUED | OUTPATIENT
Start: 2025-04-08 | End: 2025-04-12 | Stop reason: HOSPADM

## 2025-04-08 RX ORDER — TRAZODONE HYDROCHLORIDE 100 MG/1
100 TABLET ORAL NIGHTLY PRN
Status: DISCONTINUED | OUTPATIENT
Start: 2025-04-08 | End: 2025-04-12 | Stop reason: HOSPADM

## 2025-04-08 NOTE — NURSING
"Admission Note:    Jesus Manuel Lucas is a 24 y.o. male, : 2000, MRN: [id number], admitted on 2025 to [facility] ([facility]) for Frank Smith MD with a diagnosis of Depression [F32.A]. Patient admitted on a status of Physician Emergency Certificate (PEC). Jesus Manuel reports no known food or drug allergies.    Patient demonstrated an affect that was sad, flat, tearful, and anxious. Patient demonstrated mood during assessment that was depressed and anxious. Patient had an appearance that was clean.  Patient denies suicidal ideation. Patient denies suicide plan. Patient denies hallucinations. He is a PEC from [facility] ED. Tearful on admit. He states he is not suicidal and he didn't threaten his girlfriend. He stated he was singing a song "A bullet with your name on it." And "The day he quit, I'll write your name on bullet." He states these are lyrics to a song. Presently, he is homeless due to his significant other informed him not to return to the home. UDS + THC. Cooperative during assessment. Fearful for being inpatient at [facility].    Jesus Manuel's  height is 5' 11" (1.803 m) and weight is 99.2 kg (218 lb 12.8 oz). His oral temperature is 97.7 °F (36.5 °C). His blood pressure is 134/85 and his pulse is 81. His respiration is 18 and oxygen saturation is 99%.     Pias last BM was noted on: 25.    Metal detector screening performed via security personnel. The result of the scan was no contraband found. Head-to-toe physical assessment completed with the following findings: no wounds found upon body screen. A full skin assessment was performed. Pias skin appeared warm,dry,and intact.  Jesus Manuel was oriented to unit, staff, peers, and room. Patient belongings/valuables stored in locked intake room cabinet and changes of clothing provided to patient. Jesus Manuel was placed on Q 15 min observations.       "

## 2025-04-08 NOTE — ED PROVIDER NOTES
"Encounter Date: 4/8/2025    SCRIBE #1 NOTE: I, Christal Duron, am scribing for, and in the presence of,  Niesha Wild MD. I have scribed the following portions of the note - Other sections scribed: HPI, ROS, PE.       History     Chief Complaint   Patient presents with    Suicidal     Arrives with [facility] unit [facility]. Patient stated to girlfriend "there is a bullet with your name on it and I am going to kill myself and that her behavior would make him kill hisself". Family history of SI. Denies SI/HI in triage.      Patient is a 24-year-old male with no past medical history presenting to the ED for a psychiatric evaluation. The pt states his girlfriend called PD after they were involved in a verbal altercation PTA, during which he reportedly told her that he had "a bullet with your name on it" and threatened to kill himself. The pt denies SI or HI, stating that he was misquoted. He states he told his girlfriend that "the day he quit, I'll write your name on bullet", reporting that he was referencing a country song that they both knew and listened to. He states he does not want to commit suicide, noting that he has three children. He denies being in a verbal altercation earlier tonight as well, stating that he and his girlfriend were just talking prior to her leaving. He states he is sad about his girlfriend leaving him but is not depressed or feeling hopeless.    The history is provided by the patient. No  was used.     Review of patient's allergies indicates:  No Known Allergies  No past medical history on file.  No past surgical history on file.  No family history on file.  Social History[1]  Review of Systems   Psychiatric/Behavioral:  Negative for self-injury and suicidal ideas.        Physical Exam     Initial Vitals [04/08/25 0232]   BP Pulse Resp Temp SpO2   (!) 164/89 87 18 97.5 °F (36.4 °C) 100 %      MAP       --         Physical Exam    Nursing note and vitals reviewed.  Constitutional: He " appears well-developed and well-nourished. He is not diaphoretic. No distress.   HENT:   Head: Normocephalic and atraumatic.   Eyes: Conjunctivae and EOM are normal. Pupils are equal, round, and reactive to light.   Neck:   Normal range of motion.  Cardiovascular:  Normal rate, regular rhythm, normal heart sounds and intact distal pulses.           No murmur heard.  Pulmonary/Chest: Breath sounds normal. No respiratory distress. He has no wheezes. He has no rales.   Abdominal: Abdomen is soft. He exhibits no distension. There is no abdominal tenderness.   Musculoskeletal:         General: No tenderness or edema. Normal range of motion.      Cervical back: Normal range of motion.     Neurological: He is alert and oriented to person, place, and time. No cranial nerve deficit.   Skin: Skin is warm and dry. Capillary refill takes less than 2 seconds. No rash noted. No erythema.   Psychiatric: He has a normal mood and affect. He expresses no homicidal and no suicidal ideation. He expresses no suicidal plans and no homicidal plans.   The pt is calm and cooperative.         ED Course   Procedures  Labs Reviewed   COMPREHENSIVE METABOLIC PANEL - Abnormal       Result Value    Sodium 138      Potassium 3.6      Chloride 105      CO2 22      Glucose 103 (*)     Blood Urea Nitrogen 13.2      Creatinine 1.14      Calcium 9.0      Protein Total 7.9      Albumin 4.2      Globulin 3.7 (*)     Albumin/Globulin Ratio 1.1      Bilirubin Total 0.2      ALP 69      ALT 28      AST 21      eGFR >60      Anion Gap 11.0      BUN/Creatinine Ratio 12     URINALYSIS, REFLEX TO URINE CULTURE - Abnormal    Color, UA Light-Yellow      Appearance, UA Clear      Specific Gravity, UA 1.023      pH, UA 6.0      Protein, UA Negative      Glucose, UA Normal      Ketones, UA Negative      Blood, UA Trace (*)     Bilirubin, UA Negative      Urobilinogen, UA Normal      Nitrites, UA Negative      Leukocyte Esterase, UA Negative      RBC, UA 0-5       WBC, UA 6-10 (*)     Bacteria, UA None Seen      Squamous Epithelial Cells, UA None Seen      Mucous, UA Trace (*)    DRUG SCREEN, URINE (BEAKER) - Abnormal    Amphetamines, Urine Negative      Barbiturates, Urine Negative      Benzodiazepine, Urine Negative      Cannabinoids, Urine Positive (*)     Cocaine, Urine Negative      Fentanyl, Urine Negative      MDMA, Urine Negative      Opiates, Urine Negative      Phencyclidine, Urine Negative      pH, Urine 6.0      Specific Gravity, Urine Auto 1.023      Narrative:     Cut off concentrations:    Amphetamines - 1000 ng/ml  Barbiturates - 200 ng/ml  Benzodiazepine - 200 ng/ml  Cannabinoids (THC) - 50 ng/ml  Cocaine - 300 ng/ml  Fentanyl - 1.0 ng/ml  MDMA - 500 ng/ml  Opiates - 300 ng/ml   Phencyclidine (PCP) - 25 ng/ml    Specimen submitted for drug analysis and tested for pH and specific gravity in order to evaluate sample integrity. Suspect tampering if specific gravity is <1.003 and/or pH is not within the range of 4.5 - 8.0  False negatives may result form substances such as bleach added to urine.  False positives may result for the presence of a substance with similar chemical structure to the drug or its metabolite.    This test provides only a PRELIMINARY analytical test result. A more specific alternate chemical method must be used in order to obtain a confirmed analytical result. Gas chromatography/mass spectrometry (GC/MS) is the preferred confirmatory method. Other chemical confirmation methods are available. Clinical consideration and professional judgement should be applied to any drug of abuse test result, particularly when preliminary positive results are used.    Positive results will be confirmed only at the physicians request. Unconfirmed screening results are to be used only for medical purposes (treatment).        ACETAMINOPHEN LEVEL - Abnormal    Acetaminophen Level <3.0 (*)    CBC WITH DIFFERENTIAL - Abnormal    WBC 6.16      RBC 4.58 (*)     Hgb  14.6      Hct 42.3      MCV 92.4      MCH 31.9 (*)     MCHC 34.5      RDW 12.2      Platelet 227      MPV 9.8      Neut % 53.9      Lymph % 31.8      Mono % 11.2      Eos % 2.6      Basophil % 0.2      Imm Grans % 0.3      Neut # 3.32      Lymph # 1.96      Mono # 0.69      Eos # 0.16      Baso # 0.01      Imm Gran # 0.02      NRBC% 0.0     ALCOHOL,MEDICAL (ETHANOL) - Normal    Ethanol Level <10.0     CBC W/ AUTO DIFFERENTIAL    Narrative:     The following orders were created for panel order CBC auto differential.  Procedure                               Abnormality         Status                     ---------                               -----------         ------                     CBC with Differential[[id number]]       Abnormal            Final result                 Please view results for these tests on the individual orders.          Imaging Results    None          Medications   haloperidol lactate injection 5 mg (0 mg Intramuscular Hold 4/8/25 0300)   diphenhydrAMINE injection 50 mg (0 mg Intramuscular Hold 4/8/25 0300)   LORazepam injection 2 mg (0 mg Intramuscular Hold 4/8/25 0300)     Medical Decision Making  Differential diagnosis include but are not limited to: depression with si, etoh or substance induced mood disorder, anemia, misunderstanding  Cbc, cmp, ethanol, ua, uds, acetaminophen level ordered and reviewed and unremarkable  See ed course for collateral pec in place     Problems Addressed:  Depression with suicidal ideation: acute illness or injury that poses a threat to life or bodily functions  Suicidal behavior without attempted self-injury: acute illness or injury that poses a threat to life or bodily functions    Amount and/or Complexity of Data Reviewed  External Data Reviewed: notes.     Details: Prior visit for sti testing noncontributory  Labs: ordered.    Risk  OTC drugs.  Prescription drug management.      Additional MDM:   Psych: A Physician Emergency Certificate (PEC) was done in  the ED for: Suicidal. The patient has been medically cleared. Outcome: The patient was approved for transfer to another facility.        Scribe Attestation:   Scribe #1: I performed the above scribed service and the documentation accurately describes the services I performed. I attest to the accuracy of the note.  Comments: Attending:   Physician Attestation Statement for Scribe #1: INiesha MD, personally performed the services described in this documentation. All medical record entries made by the scribe were at my direction and in my presence.  I have reviewed the chart and agree that the record reflects my personal performance and is accurate and complete.        Attending Attestation:           Physician Attestation for Scribe:  Physician Attestation Statement for Scribe #1: INiesha MD, reviewed documentation, as scribed by Christal Duron in my presence, and it is both accurate and complete.             ED Course as of 25 0438      0245 Called girlfriend [phone] but went to voicemail. Left voicemail and will await call back for collateral  [BS]   0306 Discussed with girlfriend who reports she had to do an opc 2 months ago, he was unable to be found for 3 days so opc . He has made these threats before and ahs been more depressed. Strong fh depression and suicide with father and grandfather both dying by suicide. Has access to weapons and unable to get rid of them due to girlfriends job as a . He was given a 5 day notice to vacate last month and didn't leave. Tonight was given formal notice for eviction. When she got up this evening, he repeatedly was crying, screaming and saying he was going to kill himself, shoot himself and he was done and she can't just leave him prompting her to leave with her children and call 911. Pec placed [BS]      ED Course User Index  [BS] Niesha Wild MD                           Clinical  Impression:  Final diagnoses:  [R45.89] Suicidal behavior without attempted self-injury (Primary)  [F32.A, R45.851] Depression with suicidal ideation                   [1]   Social History  Tobacco Use    Smoking status: Some Days     Types: Cigarettes    Smokeless tobacco: Never   Substance Use Topics    Drug use: Yes     Frequency: 7.0 times per week     Types: Marijuana        Niesha Wild MD  04/08/25 0438

## 2025-04-08 NOTE — PROGRESS NOTES
04/08/25 1400   [facility] Group Therapy   Group Name Therapeutic Recreation   Specific Interventions Skilled Activity Leisure Education and Awareness   Participation Level None   Participation Quality Sleeping  (excused)

## 2025-04-08 NOTE — PROGRESS NOTES
04/08/25 1000   [facility] Group Therapy   Group Name Therapeutic Recreation   Specific Interventions Skilled Activity Mild Exercises   Participation Level None   Participation Quality Conflict w/ Other;Services

## 2025-04-08 NOTE — ED NOTES
SPD arrived to transport pt. Pt AAOx4, VSS. Pt denies complaints, calm, cooperative. Clinical summary including the original PEC and pt belongings provided to SPD  upon transfer. Pt escorted to vehicle by security and SPD .

## 2025-04-08 NOTE — H&P
[facility] - Behavioral Health Unit  History & Physical    Subjective:      Chief Complaint/Reason for Admission: major depression     Jesus Manuel Lucas is a 24 y.o. male. Major depression     History reviewed. No pertinent past medical history.  History reviewed. No pertinent surgical history.  No family history on file.  Social History[1]    PTA Medications   Medication Sig    ondansetron (ZOFRAN-ODT) 8 MG TbDL Take 1 tablet (8 mg total) by mouth every 8 (eight) hours as needed (nausea). Allow to dissolve on the tongue, do not chew or swallow     Review of patient's allergies indicates:  No Known Allergies     Review of Systems   Constitutional: Negative.    HENT: Negative.     Eyes: Negative.    Respiratory: Negative.     Cardiovascular: Negative.    Gastrointestinal: Negative.    Genitourinary: Negative.    Musculoskeletal: Negative.    Skin: Negative.    Neurological: Negative.    Endo/Heme/Allergies: Negative.    Psychiatric/Behavioral:  Positive for depression and suicidal ideas. Negative for hallucinations and substance abuse.        Objective:      Vital Signs (Most Recent)  Temp: 97.7 °F (36.5 °C) (04/08/25 1004)  Pulse: 81 (04/08/25 1004)  Resp: 18 (04/08/25 1004)  BP: 134/85 (04/08/25 1004)  SpO2: 99 % (04/08/25 1004)    Vital Signs Range (Last 24H):  Temp:  [97 °F (36.1 °C)-97.7 °F (36.5 °C)]   Pulse:  [72-87]   Resp:  [18-20]   BP: (131-164)/(83-89)   SpO2:  [99 %-100 %]     Physical Exam  HENT:      Head: Normocephalic.      Right Ear: Tympanic membrane normal.      Left Ear: Tympanic membrane normal.      Nose: Nose normal.      Mouth/Throat:      Mouth: Mucous membranes are moist.   Eyes:      Extraocular Movements: Extraocular movements intact.      Pupils: Pupils are equal, round, and reactive to light.   Cardiovascular:      Rate and Rhythm: Normal rate and regular rhythm.   Pulmonary:      Effort: Pulmonary effort is normal.   Abdominal:      General: Abdomen is flat.    Musculoskeletal:         General: Normal range of motion.   Skin:     General: Skin is warm.   Neurological:      General: No focal deficit present.      Mental Status: He is alert and oriented to person, place, and time.      Comments: Vision normal   Hearing normal   EOM intact   Face muscles normal  Facial sensation normal   Shrugs shoulders  Tongue midline            Data Review:    Recent Results (from the past 48 hours)   Comprehensive metabolic panel    Collection Time: 04/08/25  3:16 AM   Result Value Ref Range    Sodium 138 136 - 145 mmol/L    Potassium 3.6 3.5 - 5.1 mmol/L    Chloride 105 98 - 107 mmol/L    CO2 22 22 - 29 mmol/L    Glucose 103 (H) 74 - 100 mg/dL    Blood Urea Nitrogen 13.2 8.9 - 20.6 mg/dL    Creatinine 1.14 0.72 - 1.25 mg/dL    Calcium 9.0 8.4 - 10.2 mg/dL    Protein Total 7.9 6.4 - 8.3 gm/dL    Albumin 4.2 3.5 - 5.0 g/dL    Globulin 3.7 (H) 2.4 - 3.5 gm/dL    Albumin/Globulin Ratio 1.1 1.1 - 2.0 ratio    Bilirubin Total 0.2 <=1.5 mg/dL    ALP 69 40 - 150 unit/L    ALT 28 0 - 55 unit/L    AST 21 11 - 45 unit/L    eGFR >60 mL/min/1.73/m2    Anion Gap 11.0 mEq/L    BUN/Creatinine Ratio 12    Ethanol    Collection Time: 04/08/25  3:16 AM   Result Value Ref Range    Ethanol Level <10.0 <=10.0 mg/dL   Acetaminophen level    Collection Time: 04/08/25  3:16 AM   Result Value Ref Range    Acetaminophen Level <3.0 (L) 10.0 - 30.0 ug/ml   CBC with Differential    Collection Time: 04/08/25  3:16 AM   Result Value Ref Range    WBC 6.16 4.50 - 11.50 x10(3)/mcL    RBC 4.58 (L) 4.70 - 6.10 x10(6)/mcL    Hgb 14.6 14.0 - 18.0 g/dL    Hct 42.3 42.0 - 52.0 %    MCV 92.4 80.0 - 94.0 fL    MCH 31.9 (H) 27.0 - 31.0 pg    MCHC 34.5 33.0 - 36.0 g/dL    RDW 12.2 11.5 - 17.0 %    Platelet 227 130 - 400 x10(3)/mcL    MPV 9.8 7.4 - 10.4 fL    Neut % 53.9 %    Lymph % 31.8 %    Mono % 11.2 %    Eos % 2.6 %    Basophil % 0.2 %    Imm Grans % 0.3 %    Neut # 3.32 2.1 - 9.2 x10(3)/mcL    Lymph # 1.96 0.6 - 4.6  x10(3)/mcL    Mono # 0.69 0.1 - 1.3 x10(3)/mcL    Eos # 0.16 0 - 0.9 x10(3)/mcL    Baso # 0.01 <=0.2 x10(3)/mcL    Imm Gran # 0.02 0.00 - 0.04 x10(3)/mcL    NRBC% 0.0 %   Urinalysis, Reflex to Urine Culture    Collection Time: 04/08/25  3:19 AM    Specimen: Urine   Result Value Ref Range    Color, UA Light-Yellow Yellow, Light-Yellow, Colorless, Straw, Dark-Yellow    Appearance, UA Clear Clear    Specific Gravity, UA 1.023 1.005 - 1.030    pH, UA 6.0 5.0 - 8.5    Protein, UA Negative Negative    Glucose, UA Normal Negative, Normal    Ketones, UA Negative Negative    Blood, UA Trace (A) Negative    Bilirubin, UA Negative Negative    Urobilinogen, UA Normal 0.2, 1.0, Normal    Nitrites, UA Negative Negative    Leukocyte Esterase, UA Negative Negative    RBC, UA 0-5 None Seen, 0-2, 3-5, 0-5 /HPF    WBC, UA 6-10 (A) None Seen, 0-2, 3-5, 0-5 /HPF    Bacteria, UA None Seen None Seen, Trace /HPF    Squamous Epithelial Cells, UA None Seen None Seen, Trace /HPF    Mucous, UA Trace (A) None Seen /LPF   Drug Screen, Urine    Collection Time: 04/08/25  3:19 AM   Result Value Ref Range    Amphetamines, Urine Negative Negative    Barbiturates, Urine Negative Negative    Benzodiazepine, Urine Negative Negative    Cannabinoids, Urine Positive (A) Negative    Cocaine, Urine Negative Negative    Fentanyl, Urine Negative Negative    MDMA, Urine Negative Negative    Opiates, Urine Negative Negative    Phencyclidine, Urine Negative Negative    pH, Urine 6.0 3.0 - 11.0    Specific Gravity, Urine Auto 1.023 1.001 - 1.035        No results found.       Assessment and Plan       Major depression          [1]   Social History  Tobacco Use    Smoking status: Some Days     Types: Cigarettes    Smokeless tobacco: Never   Substance Use Topics    Drug use: Yes     Frequency: 7.0 times per week     Types: Marijuana

## 2025-04-09 PROBLEM — F33.9 RECURRENT MAJOR DEPRESSION: Status: ACTIVE | Noted: 2025-04-09

## 2025-04-09 PROBLEM — F12.20 CANNABIS DEPENDENCE, CONTINUOUS: Status: ACTIVE | Noted: 2025-04-09

## 2025-04-09 LAB
ALBUMIN SERPL-MCNC: 4.2 G/DL (ref 3.5–5)
ALBUMIN/GLOB SERPL: 1.3 RATIO (ref 1.1–2)
ALP SERPL-CCNC: 54 UNIT/L (ref 40–150)
ALT SERPL-CCNC: 28 UNIT/L (ref 0–55)
ANION GAP SERPL CALC-SCNC: 7 MEQ/L
AST SERPL-CCNC: 26 UNIT/L (ref 11–45)
BASOPHILS # BLD AUTO: 0.01 X10(3)/MCL
BASOPHILS NFR BLD AUTO: 0.2 %
BILIRUB SERPL-MCNC: 0.5 MG/DL
BUN SERPL-MCNC: 9.2 MG/DL (ref 8.9–20.6)
CALCIUM SERPL-MCNC: 9.6 MG/DL (ref 8.4–10.2)
CHLORIDE SERPL-SCNC: 109 MMOL/L (ref 98–107)
CHOLEST SERPL-MCNC: 146 MG/DL
CHOLEST/HDLC SERPL: 5 {RATIO} (ref 0–5)
CO2 SERPL-SCNC: 26 MMOL/L (ref 22–29)
CREAT SERPL-MCNC: 1.16 MG/DL (ref 0.72–1.25)
CREAT/UREA NIT SERPL: 8
EOSINOPHIL # BLD AUTO: 0.16 X10(3)/MCL (ref 0–0.9)
EOSINOPHIL NFR BLD AUTO: 2.8 %
ERYTHROCYTE [DISTWIDTH] IN BLOOD BY AUTOMATED COUNT: 12.4 % (ref 11.5–17)
EST. AVERAGE GLUCOSE BLD GHB EST-MCNC: 108.3 MG/DL
GFR SERPLBLD CREATININE-BSD FMLA CKD-EPI: >60 ML/MIN/1.73/M2
GLOBULIN SER-MCNC: 3.2 GM/DL (ref 2.4–3.5)
GLUCOSE SERPL-MCNC: 84 MG/DL (ref 74–100)
HBA1C MFR BLD: 5.4 %
HCT VFR BLD AUTO: 44.7 % (ref 42–52)
HDLC SERPL-MCNC: 30 MG/DL (ref 35–60)
HGB BLD-MCNC: 15.5 G/DL (ref 14–18)
IMM GRANULOCYTES # BLD AUTO: 0.01 X10(3)/MCL (ref 0–0.04)
IMM GRANULOCYTES NFR BLD AUTO: 0.2 %
LDLC SERPL CALC-MCNC: 99 MG/DL (ref 50–140)
LYMPHOCYTES # BLD AUTO: 2.11 X10(3)/MCL (ref 0.6–4.6)
LYMPHOCYTES NFR BLD AUTO: 37 %
MCH RBC QN AUTO: 32.2 PG (ref 27–31)
MCHC RBC AUTO-ENTMCNC: 34.7 G/DL (ref 33–36)
MCV RBC AUTO: 92.7 FL (ref 80–94)
MONOCYTES # BLD AUTO: 0.5 X10(3)/MCL (ref 0.1–1.3)
MONOCYTES NFR BLD AUTO: 8.8 %
NEUTROPHILS # BLD AUTO: 2.91 X10(3)/MCL (ref 2.1–9.2)
NEUTROPHILS NFR BLD AUTO: 51 %
NRBC BLD AUTO-RTO: 0 %
PLATELET # BLD AUTO: 274 X10(3)/MCL (ref 130–400)
PMV BLD AUTO: 10.2 FL (ref 7.4–10.4)
POTASSIUM SERPL-SCNC: 4.4 MMOL/L (ref 3.5–5.1)
PROT SERPL-MCNC: 7.4 GM/DL (ref 6.4–8.3)
RBC # BLD AUTO: 4.82 X10(6)/MCL (ref 4.7–6.1)
SODIUM SERPL-SCNC: 142 MMOL/L (ref 136–145)
T PALLIDUM AB SER QL: NONREACTIVE
TRIGL SERPL-MCNC: 84 MG/DL (ref 34–140)
TSH SERPL-ACNC: 1.54 UIU/ML (ref 0.35–4.94)
VLDLC SERPL CALC-MCNC: 17 MG/DL
WBC # BLD AUTO: 5.7 X10(3)/MCL (ref 4.5–11.5)

## 2025-04-09 PROCEDURE — 86780 TREPONEMA PALLIDUM: CPT | Performed by: PSYCHIATRY & NEUROLOGY

## 2025-04-09 PROCEDURE — 36415 COLL VENOUS BLD VENIPUNCTURE: CPT | Performed by: PSYCHIATRY & NEUROLOGY

## 2025-04-09 PROCEDURE — 80061 LIPID PANEL: CPT | Performed by: PSYCHIATRY & NEUROLOGY

## 2025-04-09 PROCEDURE — 85025 COMPLETE CBC W/AUTO DIFF WBC: CPT | Performed by: PSYCHIATRY & NEUROLOGY

## 2025-04-09 PROCEDURE — 11400000 HC PSYCH PRIVATE ROOM

## 2025-04-09 PROCEDURE — 83036 HEMOGLOBIN GLYCOSYLATED A1C: CPT | Performed by: PSYCHIATRY & NEUROLOGY

## 2025-04-09 PROCEDURE — 25000003 PHARM REV CODE 250: Performed by: PSYCHIATRY & NEUROLOGY

## 2025-04-09 PROCEDURE — 80053 COMPREHEN METABOLIC PANEL: CPT | Performed by: PSYCHIATRY & NEUROLOGY

## 2025-04-09 PROCEDURE — 84443 ASSAY THYROID STIM HORMONE: CPT | Performed by: PSYCHIATRY & NEUROLOGY

## 2025-04-09 RX ADMIN — TRAZODONE HYDROCHLORIDE 100 MG: 100 TABLET ORAL at 08:04

## 2025-04-09 NOTE — PLAN OF CARE
Problem: Adult Behavioral Health Plan of Care  Goal: Plan of Care Review  Outcome: Progressing  Flowsheets (Taken 4/8/2025 2114)  Patient Agreement with Plan of Care: agrees  Plan of Care Reviewed With: patient  Goal: Patient-Specific Goal (Individualization)  Outcome: Progressing  Flowsheets (Taken 4/8/2025 2114)  Patient Personal Strengths: independent living skills  Patient Vulnerabilities: substance abuse/addiction  Goal: Adheres to Safety Considerations for Self and Others  Outcome: Progressing  Flowsheets (Taken 4/8/2025 2114)  Adheres to Safety Considerations for Self and Others: making progress toward outcome  Intervention: Develop and Maintain Individualized Safety Plan  Flowsheets (Taken 4/8/2025 2114)  Safety Measures: safety rounds completed  Goal: Absence of New-Onset Illness or Injury  Outcome: Progressing  Intervention: Identify and Manage Fall Risk  Flowsheets (Taken 4/8/2025 2114)  Safety Measures: safety rounds completed  Intervention: Prevent Skin Injury  Flowsheets (Taken 4/8/2025 2114)  Device Skin Pressure Protection: adhesive use limited  Intervention: Prevent VTE (Venous Thromboembolism)  Flowsheets (Taken 4/8/2025 2114)  VTE Prevention/Management: fluids promoted  Intervention: Prevent Infection  Flowsheets (Taken 4/8/2025 2114)  Infection Prevention: hand hygiene promoted  Goal: Optimized Coping Skills in Response to Life Stressors  Outcome: Progressing  Flowsheets (Taken 4/8/2025 2114)  Optimized Coping Skills in Response to Life Stressors: making progress toward outcome  Intervention: Promote Effective Coping Strategies  Flowsheets (Taken 4/8/2025 2114)  Supportive Measures:   self-care encouraged   self-reflection promoted  Goal: Develops/Participates in Therapeutic [place] to Support Successful Transition  Outcome: Progressing  Flowsheets (Taken 4/8/2025 2114)  Develops/Participates in Therapeutic [place] to Support Successful Transition: making progress toward  outcome  Intervention: Foster Therapeutic [place]  Flowsheets (Taken 4/8/2025 2114)  Trust Relationship/Rapport: care explained  Goal: Rounds/Family Conference  Outcome: Progressing     Problem: Violence Risk or Actual  Goal: Anger and Impulse Control  Outcome: Progressing     Problem: Depressive Signs/Symptoms  Goal: Optimized Energy Level (Depressive Signs/Symptoms)  Outcome: Progressing  Intervention: Optimize Energy Level  Flowsheets (Taken 4/8/2025 2114)  Activity (Behavioral Health): up ad zayda  Diversional Activity: television  Goal: Optimized Cognitive Function (Depressive Signs/Symptoms)  Outcome: Progressing  Flowsheets (Taken 4/8/2025 2114)  Mutually Determined Action Steps (Optimized Cognitive Function): participates in attention training  Goal: Increased Participation and Engagement (Depressive Signs/Symptoms)  Outcome: Progressing  Flowsheets (Taken 4/8/2025 2114)  Mutually Determined Action Steps (Increased Participation and Engagement): participates in one or more activity  Intervention: Facilitate Participation and Engagement  Flowsheets (Taken 4/8/2025 2114)  Supportive Measures:   self-care encouraged   self-reflection promoted  Diversional Activity: television  Goal: Enhanced Self-Esteem and Confidence (Depressive Signs/Symptoms)  Outcome: Progressing  Flowsheets (Taken 4/8/2025 2114)  Mutually Determined Action Steps (Enhanced Self-Esteem and Confidence): identifies judgmental thoughts  Intervention: Promote Confidence and Self-Esteem  Flowsheets (Taken 4/8/2025 2114)  Supportive Measures:   self-care encouraged   self-reflection promoted  Goal: Improved Mood Symptoms (Depressive Signs/Symptoms)  Outcome: Progressing  Flowsheets (Taken 4/8/2025 2114)  Mutually Determined Action Steps (Improved Mood Symptoms): acknowledges progress  Intervention: Promote Mood Improvement  Flowsheets (Taken 4/8/2025 2114)  Supportive Measures:   self-care encouraged   self-reflection promoted  Diversional  Activity: television  Goal: Optimized Nutrition Intake (Depressive Signs/Symptoms)  Outcome: Progressing  Flowsheets (Taken 4/8/2025 2114)  Mutually Determined Action Steps (Optimized Nutrition Intake): eats at meal time  Intervention: Promote Optimal Nutrition Intake  Flowsheets (Taken 4/8/2025 2114)  Nutrition Interventions: food preferences provided  Bowel Elimination Promotion: adequate fluid intake promoted  Goal: Improved Psychomotor Symptoms (Depressive Signs/Symptoms)  Outcome: Progressing  Flowsheets (Taken 4/8/2025 2114)  Mutually Determined Action Steps (Improved Psychomotor Symptoms): adheres to medication regimen  Intervention: Manage Psychomotor Movement  Flowsheets (Taken 4/8/2025 2114)  Activity (Behavioral Health): up ad zayda  Diversional Activity: television  Goal: Improved Sleep (Depressive Signs/Symptoms)  Outcome: Progressing  Flowsheets (Taken 4/8/2025 2114)  Mutually Determined Action Steps (Improved Sleep): sleeps 4-6 hours at night  Intervention: Promote Healthy Sleep Hygiene  Flowsheets (Taken 4/8/2025 2114)  Sleep Hygiene Promotion: regular sleep pattern promoted  Goal: Enhanced Social, Occupational or Functional Skills (Depressive Signs/Symptoms)  Outcome: Progressing  Flowsheets (Taken 4/8/2025 2114)  Mutually Determined Action Steps (Enhanced Social, Occupational or Functional Skills): participates in social skills training  Intervention: Promote Social, Occupational and Functional Ability  Flowsheets (Taken 4/8/2025 2114)  Social Functional Ability Promotion: autonomy promoted     Problem: Excessive Substance Use  Goal: Optimized Energy Level (Excessive Substance Use)  Outcome: Progressing  Flowsheets (Taken 4/8/2025 2114)  Mutually Determined Action Steps (Optimized Energy Level): grooms self without prompting  Intervention: Optimize Energy Level  Flowsheets (Taken 4/8/2025 2114)  Activity (Behavioral Health): up ad zayda  Diversional Activity: television  Goal: Improved Behavioral  Control (Excessive Substance Use)  Outcome: Progressing  Flowsheets (Taken 4/8/2025 2114)  Mutually Determined Action Steps (Improved Behavioral Control): identifies major stressors  Intervention: Promote Behavior and Impulse Control  Flowsheets (Taken 4/8/2025 2114)  Behavior Management: behavioral plan reviewed  Goal: Increased Participation and Engagement (Excessive Substance Use)  Outcome: Progressing  Flowsheets (Taken 4/8/2025 2114)  Mutually Determined Action Steps (Increased Participation and Engagement): discusses ongoing recovery plan  Intervention: Facilitate Participation and Engagement  Flowsheets (Taken 4/8/2025 2114)  Supportive Measures:   self-care encouraged   self-reflection promoted  Diversional Activity: television  Goal: Improved Physiologic Symptoms (Excessive Substance Use)  Outcome: Progressing  Flowsheets (Taken 4/8/2025 2114)  Mutually Determined Action Steps (Improved Physiologic Symptoms): verbalizes physical symptoms  Intervention: Optimize Physiologic Function  Flowsheets (Taken 4/8/2025 2114)  Nutrition Interventions: food preferences provided  Goal: Enhanced Social, Occupational or Functional Skills (Excessive Substance Use)  Outcome: Progressing  Flowsheets (Taken 4/8/2025 2114)  Mutually Determined Action Steps (Enhanced Social, Occupational or Functional Skills): participates in social skills training  Intervention: Promote Social, Occupational and Functional Ability  Flowsheets (Taken 4/8/2025 2114)  Trust Relationship/Rapport: care explained  Social Functional Ability Promotion: autonomy promoted   AAO X 4. Anxious and depressed mood. Denies SI, HI, and hallucinations. Good eye contact noted. Lying in bed. Denies pain at present. No scheduled meds. Did not participate in groups. Continue plan of care and provide a safe and therapeutic environment. Every fifteen minute rounding continued for safety.

## 2025-04-09 NOTE — NURSING
Patient denies anxiety and depression,denies SI and HI ,denies anxiety and depression,denies visual and auditory hallucinations. Stated  he has an argument with girlfriend that he is living with for 7 years. Patient confirmed with a gun at home. Family history of suicide father and grandfather. Patient making threats to girlfriend about a bullet with the girlfriends name on it. Patient calm  and cooperative with staff.
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
## 2025-04-09 NOTE — PROGRESS NOTES
Jesus Manuel is a 25y/o male admitted for Depression (F33.9) and Cannabis use disorder (F12.20) with a uds +cannabis. CTRS met with Pt 1:1, Jesus Manuel was cooperative, reported admission as Depression, seasonal depression, and an argument with my spouse (GF). Cody reported ability to perform his ADL's. CTRS educated Pt to TR group times and dates with Jesus Manuel agreeing to attend and participate in TR groups. Jesus Manuel reported treatment goal as Relaxation techniques.       04/08/25 1101   General   Admit Date 04/08/25   Primary Diagnosis Depression (F33.9)   Secondary Diagnosis Cannabis use disorder (F12.20)   Alevism Taoism   Number of Children 3   Children Living? 3   Occupation [employer]    Does the patient have dentures? No   If you were to take part in activities, which of the following would you prefer? Both   Do you feel like you have enough to keep you busy now? Yes   Do you believe that you have the opportunity for physical activity? Yes   Activity Capabilities Maximum   Subjective   Patient states depression, seasonal depression and a argument with my spouse (GF)   Assessment   Mobility ambulates independently   Transfers independently   Musculoskeletal   (none reported)   Visual Acuity normal vision   Visual Perception depth perception;color perception;recognizes letters;recognizes numbers   Hearing normal   Speech/Communication normal   Cognitive Concerns oriented x4   Emotional Concerns appears depressed;appears anxious  (tearful)   Leisure Interest Survey   Leisure Interest Survey Yes   Physical Activities   Fitness/Exercise Programs Current Interest   Walk/Run Current Interest   Goals   Additional Documentation yes   Goal Formulation With patient   Time For Goal Achievement 7 days   Goal 1 Relaxation techniques   Goal 1-Progress Ongoing- not progressing, not progressing,   Plan   Planned Therapy Intervention Group Recreational Therapy   Expected Length of Stay 5-7days   PT Frequency Minimum of 3  visits per week

## 2025-04-09 NOTE — PLAN OF CARE
Problem: Adult Behavioral Health Plan of Care  Goal: Plan of Care Review  Flowsheets (Taken 4/9/2025 0852)  Patient Agreement with Plan of Care: agrees  Plan of Care Reviewed With: patient       Patient met with treatment team to discuss current care plan. Patient agreed with current care plan. Discharge plans will be finalized following completion of assessments.       Margaret shaverw-bacs

## 2025-04-09 NOTE — PROGRESS NOTES
Refused despite encouragement, Alternative materials were offered.   04/09/25 1500   [facility] Group Therapy   Group Name Therapeutic Recreation   Specific Interventions Skilled Activity Leisure Education and Awareness   Participation Level None   Participation Quality Refused;Lack of Interest

## 2025-04-09 NOTE — PROGRESS NOTES
04/09/25 1400   [facility] Group Therapy   Group Name Therapeutic Recreation   Specific Interventions Skilled Activity Self-Expression   Participation Level Active;Supportive;Appropriate;Attentive;Sharing   Participation Quality Cooperative   Insight/Motivation Limited   Affect/Mood Display Appropriate   Cognition Oriented;Alert   Psychomotor WNL

## 2025-04-09 NOTE — PLAN OF CARE
Problem: Depressive Signs/Symptoms  Goal: Optimized Energy Level (Depressive Signs/Symptoms)  Outcome: Progressing  Flowsheets (Taken 4/9/2025 1808)  Mutually Determined Action Steps (Optimized Energy Level): grooms self without prompting  Goal: Optimized Cognitive Function (Depressive Signs/Symptoms)  Outcome: Progressing  Flowsheets (Taken 4/9/2025 1808)  Mutually Determined Action Steps (Optimized Cognitive Function): participates in memory training  Goal: Improved Sleep (Depressive Signs/Symptoms)  Outcome: Progressing  Flowsheets (Taken 4/9/2025 1808)  Mutually Determined Action Steps (Improved Sleep): sleeps 4-6 hours at night

## 2025-04-10 PROCEDURE — 11400000 HC PSYCH PRIVATE ROOM

## 2025-04-10 PROCEDURE — 25000003 PHARM REV CODE 250: Performed by: PSYCHIATRY & NEUROLOGY

## 2025-04-10 RX ADMIN — TRAZODONE HYDROCHLORIDE 100 MG: 100 TABLET ORAL at 08:04

## 2025-04-10 NOTE — PROGRESS NOTES
04/10/25 1000   [facility] Group Therapy   Group Name Therapeutic Recreation   Specific Interventions Skilled Activity Mild Exercises   Participation Level Active;Supportive;Appropriate;Attentive;Sharing   Participation Quality Cooperative;Social   Insight/Motivation Limited;Applies New Skills   Affect/Mood Display Appropriate   Cognition Oriented;Alert   Psychomotor WNL

## 2025-04-10 NOTE — PROGRESS NOTES
Relaxation Education: Aromatherapy and Sound Therapy; Co-facilitated with Leanne Dolan LPN.       04/10/25 1500   [facility] Group Therapy   Group Name Therapeutic Recreation   Specific Interventions Relaxation Training   Participation Level Active;Supportive;Appropriate;Attentive;Sharing   Participation Quality Cooperative;Social   Insight/Motivation Improved;Applies New Skills   Affect/Mood Display Appropriate;Bright   Cognition Oriented;Alert   Psychomotor WNL

## 2025-04-10 NOTE — GROUP NOTE
Group Psychotherapy       Group Focus: Promoting Healthy Lifestyles  Group topic: Discharge Planning. Therapist explored patients need for identifying personal strengths and qualities in working towards mental health goals. Patients were given the opportunity to engage in written worksheets to gain more insight on their personal dc plan.         Number of patients in attendance: 5  Group Start Time: 1045  Group End Time:  1130  Groups Date: 4/10/2025  Group Topic:  Behavioral Health  Group Department: [facility] - Behavioral Health Unit  Group Facilitators:  Shama Lara MSW  _____________________________________________________________________    Patient Name: Jesus Manuel Lucas  MRN: [id number]  Patient Class: IP- Psych   Admission Date\Time: 4/8/2025 10:04 AM  Hospital Length of Stay: 3  Primary Care Provider: Rachael, Primary Doctor     Referred by: Acute Psychiatry Unit Treatment Team     Target symptoms: Depression and Poor Coping Skills     Patient's response to treatment: Active Listening and Self-disclosure     Progress toward goals: Minimal progress     Plan: Continue treatment on [facility]

## 2025-04-10 NOTE — PROGRESS NOTES
"4/10/2025  Jesus Manuel Lucas   2000   [id number]        Psychiatry Progress Note     Chief Complaint: "Edy"    SUBJECTIVE:   Jesus Manuel Lucas is a 24 y.o. male placed under a Physician's Emergency Certificate at [facility] due to suicidal ideation.     The patient was seen today and described his mood as edy. He remains off medications and continues to deny any need for psychotropic treatment. At this time, there is no indication for forced medication, as he is calm, cooperative, and demonstrating appropriate affect throughout the interaction.    He expressed hopefulness about the future, specifically regarding his relationship with his daughter. He denies any current suicidal or homicidal ideation, and there were no overt signs of psychosis, agitation, or mood instability during the evaluation.    The patient appears psychiatrically stable, with no acute safety concerns or evidence of decompensation. He has remained behaviorally appropriate and is maintaining goal-directed thought processes. Although not on medication, he is functioning well in the current structured setting and demonstrates positive engagement when discussing future plans.       Current Medications:   Scheduled Meds:    nicotine  1 patch Transdermal Daily      PRN Meds:   Current Facility-Administered Medications:     acetaminophen, 650 mg, Oral, Q6H PRN    aluminum-magnesium hydroxide-simethicone, 30 mL, Oral, Q6H PRN    haloperidoL, 5 mg, Oral, Q6H PRN **AND** diphenhydrAMINE, 50 mg, Oral, Q6H PRN **AND** LORazepam, 2 mg, Oral, Q6H PRN **AND** haloperidol lactate, 5 mg, Intramuscular, Q6H PRN **AND** diphenhydrAMINE, 50 mg, Intramuscular, Q6H PRN **AND** lorazepam, 2 mg, Intramuscular, Q6H PRN    hydrOXYzine HCL, 50 mg, Oral, Q4H PRN    traZODone, 100 mg, Oral, Nightly PRN   Psychotherapeutics (From admission, onward)      Start     Stop Route Frequency Ordered    04/08/25 1008  haloperidoL tablet 5 mg  (Med - Acute  Behavioral Management)        Placed " "in "And" Linked Group    -- Oral Every 6 hours PRN 04/08/25 1008    04/08/25 1008  LORazepam tablet 2 mg  (Med - Acute  Behavioral Management)        Placed in "And" Linked Group    -- Oral Every 6 hours PRN 04/08/25 1008    04/08/25 1008  haloperidol lactate injection 5 mg  (Med - Acute  Behavioral Management)        Placed in "And" Linked Group    -- IM Every 6 hours PRN 04/08/25 1008    04/08/25 1008  LORazepam injection 2 mg  (Med - Acute  Behavioral Management)        Placed in "And" Linked Group    -- IM Every 6 hours PRN 04/08/25 1008    04/08/25 1008  traZODone tablet 100 mg         -- Oral Nightly PRN 04/08/25 1008            Allergies:   Review of patient's allergies indicates:  No Known Allergies     OBJECTIVE:   Vitals   Vitals:    04/10/25 0701   BP: 106/76   Pulse: 65   Resp: 18   Temp: 98.4 °F (36.9 °C)        Labs/Imaging/Studies:   Recent Results (from the past 36 hours)   Lipid panel    Collection Time: 04/09/25  7:21 AM   Result Value Ref Range    Cholesterol Total 146 <=200 mg/dL    HDL Cholesterol 30 (L) 35 - 60 mg/dL    Triglyceride 84 34 - 140 mg/dL    Cholesterol/HDL Ratio 5 0 - 5    Very Low Density Lipoprotein 17     LDL Cholesterol 99.00 50.00 - 140.00 mg/dL   Comprehensive metabolic panel    Collection Time: 04/09/25  7:21 AM   Result Value Ref Range    Sodium 142 136 - 145 mmol/L    Potassium 4.4 3.5 - 5.1 mmol/L    Chloride 109 (H) 98 - 107 mmol/L    CO2 26 22 - 29 mmol/L    Glucose 84 74 - 100 mg/dL    Blood Urea Nitrogen 9.2 8.9 - 20.6 mg/dL    Creatinine 1.16 0.72 - 1.25 mg/dL    Calcium 9.6 8.4 - 10.2 mg/dL    Protein Total 7.4 6.4 - 8.3 gm/dL    Albumin 4.2 3.5 - 5.0 g/dL    Globulin 3.2 2.4 - 3.5 gm/dL    Albumin/Globulin Ratio 1.3 1.1 - 2.0 ratio    Bilirubin Total 0.5 <=1.5 mg/dL    ALP 54 40 - 150 unit/L    ALT 28 0 - 55 unit/L    AST 26 11 - 45 unit/L    eGFR >60 mL/min/1.73/m2    Anion Gap 7.0 mEq/L    BUN/Creatinine Ratio 8    TSH    Collection Time: 04/09/25  7:21 AM " "  Result Value Ref Range    TSH 1.541 0.350 - 4.940 uIU/mL   Hemoglobin A1C    Collection Time: 04/09/25  7:21 AM   Result Value Ref Range    Hemoglobin A1c 5.4 <=7.0 %    Estimated Average Glucose 108.3 mg/dL   SYPHILIS ANTIBODY (WITH REFLEX RPR)    Collection Time: 04/09/25  7:21 AM   Result Value Ref Range    Syphilis Antibody Nonreactive Nonreactive, Equivocal   CBC with Differential    Collection Time: 04/09/25  7:21 AM   Result Value Ref Range    WBC 5.70 4.50 - 11.50 x10(3)/mcL    RBC 4.82 4.70 - 6.10 x10(6)/mcL    Hgb 15.5 14.0 - 18.0 g/dL    Hct 44.7 42.0 - 52.0 %    MCV 92.7 80.0 - 94.0 fL    MCH 32.2 (H) 27.0 - 31.0 pg    MCHC 34.7 33.0 - 36.0 g/dL    RDW 12.4 11.5 - 17.0 %    Platelet 274 130 - 400 x10(3)/mcL    MPV 10.2 7.4 - 10.4 fL    Neut % 51.0 %    Lymph % 37.0 %    Mono % 8.8 %    Eos % 2.8 %    Basophil % 0.2 %    Imm Grans % 0.2 %    Neut # 2.91 2.1 - 9.2 x10(3)/mcL    Lymph # 2.11 0.6 - 4.6 x10(3)/mcL    Mono # 0.50 0.1 - 1.3 x10(3)/mcL    Eos # 0.16 0 - 0.9 x10(3)/mcL    Baso # 0.01 <=0.2 x10(3)/mcL    Imm Gran # 0.01 0.00 - 0.04 x10(3)/mcL    NRBC% 0.0 %          Medical Review Of Systems:  A comprehensive review of systems was negative.      Psychiatric Mental Status Exam:  General Appearance: appears stated age, well-developed, well-nourished  Arousal: alert  Behavior: cooperative  Movements and Motor Activity: no abnormal involuntary movements noted  Orientation: oriented to person, place, time, and situation  Speech: normal rate, normal rhythm, normal volume, normal tone  Mood: "Edy"  Affect: constricted  Thought Process: linear  Associations: intact  Thought Content and Perceptions: denies suicidal ideation, no homicidal ideation, no auditory hallucinations, no visual hallucinations, no paranoid ideation, no ideas of reference  Recent and Remote Memory: recent memory intact, remote memory intact; per interview/observation with patient  Attention and Concentration: intact, attentive to " conversation; per interview/observation with patient  Fund of Knowledge: intact, aware of current events, vocabulary appropriate; based on history, vocabulary, fund of knowledge, syntax, grammar, and content  Insight: questionable; based on understanding of severity of illness and HPI  Judgment: questionable; based on patient's behavior and HPI    ASSESSMENT/PLAN:   Problems Addressed/Diagnoses:  Depression (F33.9)  Cannabis use disorder (F12.20)     History reviewed. No pertinent past medical history.     Plan:  Depression, chronic with acute exacerbation  -Patient deferring medication    Expected Disposition Plan: Home        Joseph Moran PMARACELISP-BC

## 2025-04-10 NOTE — PLAN OF CARE
Problem: Adult Behavioral Health Plan of Care  Goal: Plan of Care Review  Outcome: Progressing  Flowsheets (Taken 4/10/2025 1041)  Patient Agreement with Plan of Care: agrees  Plan of Care Reviewed With: patient  Goal: Patient-Specific Goal (Individualization)  Outcome: Progressing  Flowsheets (Taken 4/10/2025 1041)  Patient Personal Strengths: independent living skills  Patient Vulnerabilities: family/relationship conflict  Goal: Adheres to Safety Considerations for Self and Others  Outcome: Progressing  Flowsheets (Taken 4/10/2025 1041)  Adheres to Safety Considerations for Self and Others: making progress toward outcome  Intervention: Develop and Maintain Individualized Safety Plan  Flowsheets (Taken 4/10/2025 1041)  Safety Measures: safety rounds completed  Goal: Optimized Coping Skills in Response to Life Stressors  Outcome: Progressing  Flowsheets (Taken 4/10/2025 1041)  Optimized Coping Skills in Response to Life Stressors: making progress toward outcome  Intervention: Promote Effective Coping Strategies  Flowsheets (Taken 4/10/2025 1041)  Supportive Measures: self-care encouraged  Goal: Develops/Participates in Therapeutic [place] to Support Successful Transition  Outcome: Progressing  Flowsheets (Taken 4/10/2025 1041)  Develops/Participates in Therapeutic [place] to Support Successful Transition: making progress toward outcome  Intervention: Foster Therapeutic [place]  Flowsheets (Taken 4/10/2025 1041)  Trust Relationship/Rapport: care explained  Goal: Rounds/Family Conference  Outcome: Progressing  Flowsheets (Taken 4/10/2025 1041)  Participants:   milieu/psych techs   nursing     Problem: Violence Risk or Actual  Goal: Anger and Impulse Control  Outcome: Progressing  Intervention: Minimize Safety Risk  Flowsheets (Taken 4/10/2025 1041)  Behavior Management: behavioral plan reviewed  Sensory Stimulation Regulation: quiet environment promoted  De-Escalation Techniques: quiet time  facilitated  Intervention: Promote Self-Control  Flowsheets (Taken 4/10/2025 1041)  Supportive Measures: self-care encouraged  Environmental Support: calm environment promoted     Problem: Depressive Signs/Symptoms  Goal: Optimized Energy Level (Depressive Signs/Symptoms)  Outcome: Progressing  Flowsheets (Taken 4/10/2025 1041)  Mutually Determined Action Steps (Optimized Energy Level): grooms self without prompting  Intervention: Optimize Energy Level  Flowsheets (Taken 4/10/2025 1041)  Activity (Behavioral Health): up ad zayda  Patient Performed Hygiene: teeth brushed  Diversional Activity: television  Goal: Optimized Cognitive Function (Depressive Signs/Symptoms)  Outcome: Progressing  Flowsheets (Taken 4/10/2025 1041)  Mutually Determined Action Steps (Optimized Cognitive Function): participates in attention training  Goal: Increased Participation and Engagement (Depressive Signs/Symptoms)  Outcome: Progressing  Flowsheets (Taken 4/10/2025 1041)  Mutually Determined Action Steps (Increased Participation and Engagement): participates in one or more activity  Intervention: Facilitate Participation and Engagement  Flowsheets (Taken 4/10/2025 1041)  Supportive Measures: self-care encouraged  Diversional Activity: television  Goal: Enhanced Self-Esteem and Confidence (Depressive Signs/Symptoms)  Outcome: Progressing  Flowsheets (Taken 4/10/2025 1041)  Mutually Determined Action Steps (Enhanced Self-Esteem and Confidence): verbalizes successes  Intervention: Promote Confidence and Self-Esteem  Flowsheets (Taken 4/10/2025 1041)  Supportive Measures: self-care encouraged  Goal: Improved Mood Symptoms (Depressive Signs/Symptoms)  Outcome: Progressing  Flowsheets (Taken 4/10/2025 1041)  Mutually Determined Action Steps (Improved Mood Symptoms): acknowledges progress  Intervention: Promote Mood Improvement  Flowsheets (Taken 4/10/2025 1041)  Supportive Measures: self-care encouraged  Diversional Activity: television  Goal:  Optimized Nutrition Intake (Depressive Signs/Symptoms)  Outcome: Progressing  Flowsheets (Taken 4/10/2025 1041)  Mutually Determined Action Steps (Optimized Nutrition Intake): eats at meal time  Intervention: Promote Optimal Nutrition Intake  Flowsheets (Taken 4/10/2025 1041)  Nutrition Interventions: food preferences provided  Bowel Elimination Promotion: adequate fluid intake promoted  Goal: Improved Psychomotor Symptoms (Depressive Signs/Symptoms)  Outcome: Progressing  Flowsheets (Taken 4/10/2025 1041)  Mutually Determined Action Steps (Improved Psychomotor Symptoms): adheres to medication regimen  Intervention: Manage Psychomotor Movement  Flowsheets (Taken 4/10/2025 1041)  Activity (Behavioral Health): up ad zayda  Patient Performed Hygiene: teeth brushed  Diversional Activity: television  Goal: Improved Sleep (Depressive Signs/Symptoms)  Outcome: Progressing  Flowsheets (Taken 4/10/2025 1041)  Mutually Determined Action Steps (Improved Sleep): sleeps 4-6 hours at night  Intervention: Promote Healthy Sleep Hygiene  Flowsheets (Taken 4/10/2025 1041)  Sleep Hygiene Promotion: regular sleep pattern promoted  Goal: Enhanced Social, Occupational or Functional Skills (Depressive Signs/Symptoms)  Outcome: Progressing  Flowsheets (Taken 4/10/2025 1041)  Mutually Determined Action Steps (Enhanced Social, Occupational or Functional Skills): participates in social skills training  Intervention: Promote Social, Occupational and Functional Ability  Flowsheets (Taken 4/10/2025 1041)  Social Functional Ability Promotion: autonomy promoted    He is AAO X 4. Denies SI, HI, hallucinations, and delusions. Good eye contact noted. Speech normal tone and speed. He interacts with selected patients and staff. Continue plan of care and provide a safe and therapeutic environment. Continue to monitor every fifteen minutes for safety.

## 2025-04-10 NOTE — NURSING
2007 Pt requested PRN sleep medication. Administered trazodone 100 mg po.     2037 Pt in day room watching TV

## 2025-04-10 NOTE — GROUP NOTE
Group Psychotherapy       Group Focus: Stress Management   Group Topic: Stress Management/Crisis Plan. Therapist assisted with understanding of treatment plan, identification of responsibilities for actions, assisted patients in identifying sources of support and developing awareness of crisis symptoms.    Number of patients in attendance: 10  Group Start Time: 1045  Group End Time:  1130  Groups Date: 4/9/2025  Group Topic:  Behavioral Health  Group [facility]  Group Facilitators:  Estelle Rangel  _____________________________________________________________________    Patient Name: Jesus Manuel Lucas  MRN: [id number]  Patient Class: IP- Psych   Admission Date\Time: 4/8/2025 10:04 AM  Hospital Length of Stay: 2  Primary Care Provider: Rachael, Primary Doctor     Referred by: Acute Psychiatry Unit Treatment Team     Target symptoms: Substance Abuse and Depression     Patient's response to treatment: Not Participating     Progress toward goals: Minimal progress     Interval History:      Diagnosis:      Plan: Continue treatment on APU

## 2025-04-10 NOTE — PLAN OF CARE
Problem: Adult Behavioral Health Plan of Care  Goal: Plan of Care Review  Outcome: Progressing  Flowsheets (Taken 4/9/2025 2010)  Patient Agreement with Plan of Care: agrees  Plan of Care Reviewed With: patient  Goal: Patient-Specific Goal (Individualization)  Outcome: Progressing  Flowsheets (Taken 4/9/2025 2010)  Patient Personal Strengths: independent living skills  Patient Vulnerabilities: family/relationship conflict  Goal: Adheres to Safety Considerations for Self and Others  Outcome: Progressing  Flowsheets (Taken 4/9/2025 2010)  Adheres to Safety Considerations for Self and Others: making progress toward outcome  Intervention: Develop and Maintain Individualized Safety Plan  Flowsheets (Taken 4/9/2025 2010)  Safety Measures: safety plan reviewed  Goal: Optimized Coping Skills in Response to Life Stressors  Outcome: Progressing  Flowsheets (Taken 4/9/2025 2010)  Optimized Coping Skills in Response to Life Stressors: making progress toward outcome  Intervention: Promote Effective Coping Strategies  Flowsheets (Taken 4/9/2025 2010)  Supportive Measures:   active listening utilized   verbalization of feelings encouraged   self-care encouraged  Goal: Develops/Participates in Therapeutic [place] to Support Successful Transition  Outcome: Progressing  Flowsheets (Taken 4/9/2025 2010)  Develops/Participates in Therapeutic [place] to Support Successful Transition: making progress toward outcome  Intervention: Foster Therapeutic [place]  Flowsheets (Taken 4/9/2025 2010)  Trust Relationship/Rapport:   care explained   questions encouraged   reassurance provided  Goal: Rounds/Family Conference  Outcome: Progressing  Flowsheets (Taken 4/9/2025 2010)  Participants:   nursing   milieu/psych techs     Problem: Violence Risk or Actual  Goal: Anger and Impulse Control  Outcome: Progressing  Intervention: Minimize Safety Risk  Flowsheets (Taken 4/9/2025 2010)  Behavior Management: behavioral plan reviewed  Sensory  Stimulation Regulation: quiet environment promoted  De-Escalation Techniques: quiet time facilitated  Enhanced Safety Measures: monitored by video  Intervention: Promote Self-Control  Flowsheets (Taken 4/9/2025 2010)  Supportive Measures:   active listening utilized   verbalization of feelings encouraged   self-care encouraged  Environmental Support: calm environment promoted     Problem: Depressive Signs/Symptoms  Goal: Optimized Energy Level (Depressive Signs/Symptoms)  Outcome: Progressing  Flowsheets (Taken 4/9/2025 2010)  Mutually Determined Action Steps (Optimized Energy Level): grooms self without prompting  Intervention: Optimize Energy Level  Flowsheets (Taken 4/9/2025 2010)  Activity (Behavioral Health): up ad zayda  Patient Performed Hygiene: teeth brushed  Diversional Activity: television  Goal: Optimized Cognitive Function (Depressive Signs/Symptoms)  Outcome: Progressing  Flowsheets (Taken 4/9/2025 2010)  Mutually Determined Action Steps (Optimized Cognitive Function): participates in attention training  Goal: Increased Participation and Engagement (Depressive Signs/Symptoms)  Outcome: Progressing  Flowsheets (Taken 4/9/2025 2010)  Mutually Determined Action Steps (Increased Participation and Engagement): participates in one or more activity  Intervention: Facilitate Participation and Engagement  Flowsheets (Taken 4/9/2025 2010)  Supportive Measures:   active listening utilized   verbalization of feelings encouraged   self-care encouraged  Diversional Activity: television  Goal: Enhanced Self-Esteem and Confidence (Depressive Signs/Symptoms)  Outcome: Progressing  Flowsheets (Taken 4/9/2025 2010)  Mutually Determined Action Steps (Enhanced Self-Esteem and Confidence): verbalizes successes  Intervention: Promote Confidence and Self-Esteem  Flowsheets (Taken 4/9/2025 2010)  Supportive Measures:   active listening utilized   verbalization of feelings encouraged   self-care encouraged  Goal: Improved Mood  Symptoms (Depressive Signs/Symptoms)  Outcome: Progressing  Flowsheets (Taken 4/9/2025 2010)  Mutually Determined Action Steps (Improved Mood Symptoms): acknowledges progress  Intervention: Promote Mood Improvement  Flowsheets (Taken 4/9/2025 2010)  Supportive Measures:   active listening utilized   verbalization of feelings encouraged   self-care encouraged  Diversional Activity: television  Goal: Optimized Nutrition Intake (Depressive Signs/Symptoms)  Outcome: Progressing  Flowsheets (Taken 4/9/2025 2010)  Mutually Determined Action Steps (Optimized Nutrition Intake): eats at meal time  Intervention: Promote Optimal Nutrition Intake  Flowsheets (Taken 4/9/2025 2010)  Bowel Elimination Promotion:   adequate fluid intake promoted   ambulation promoted  Goal: Improved Psychomotor Symptoms (Depressive Signs/Symptoms)  Outcome: Progressing  Flowsheets (Taken 4/9/2025 2010)  Mutually Determined Action Steps (Improved Psychomotor Symptoms): adheres to medication regimen  Intervention: Manage Psychomotor Movement  Flowsheets (Taken 4/9/2025 2010)  Activity (Behavioral Health): up ad zayda  Patient Performed Hygiene: teeth brushed  Diversional Activity: television  Goal: Improved Sleep (Depressive Signs/Symptoms)  Outcome: Progressing  Flowsheets (Taken 4/9/2025 2010)  Mutually Determined Action Steps (Improved Sleep): sleeps 4-6 hours at night  Intervention: Promote Healthy Sleep Hygiene  Flowsheets (Taken 4/9/2025 2010)  Sleep Hygiene Promotion:   awakenings minimized   regular sleep pattern promoted  Goal: Enhanced Social, Occupational or Functional Skills (Depressive Signs/Symptoms)  Outcome: Progressing  Flowsheets (Taken 4/9/2025 2010)  Mutually Determined Action Steps (Enhanced Social, Occupational or Functional Skills): participates in social skills training  Intervention: Promote Social, Occupational and Functional Ability  Flowsheets (Taken 4/9/2025 2010)  Social Functional Ability Promotion:   autonomy promoted    self-expression encouraged   social interaction promoted     AAOx4 Pt denies SI/HI/AVH. Pt denies having anxiety and depression, reports good sleep and good appetite. Pt has good eye contact and affect is appropriate. Q15 min safety checks continued.

## 2025-04-11 PROCEDURE — 11400000 HC PSYCH PRIVATE ROOM

## 2025-04-11 PROCEDURE — 25000003 PHARM REV CODE 250: Performed by: PSYCHIATRY & NEUROLOGY

## 2025-04-11 RX ADMIN — TRAZODONE HYDROCHLORIDE 100 MG: 100 TABLET ORAL at 08:04

## 2025-04-11 NOTE — PROGRESS NOTES
04/11/25 1400   [facility] Group Therapy   Group Name Therapeutic Recreation   Specific Interventions Skilled Activity Leisure Education and Awareness   Participation Level Active;Supportive;Appropriate;Attentive;Sharing   Participation Quality Cooperative;Social   Insight/Motivation Improved;Applies New Skills   Affect/Mood Display Appropriate   Cognition Oriented;Alert   Psychomotor WNL

## 2025-04-11 NOTE — GROUP NOTE
Group Psychotherapy       Group Focus: Mindfulness      Group Topic: Mindfulness. Therapist explored patients need for increase in awareness of mindfulness, how it effects our moods and behaviors. SW discussed importance of mindfulness to decrease negative core values and thoughts, coping skills . Mindfulness activity used to encourage development of healthy coping and relaxation skills. ?      Number of patients in attendance: 6  Group Start Time: 1100  Group End Time:  1145  Groups Date: 4/11/2025  Group Topic:  Behavioral Health  Group Department: [facility]  Group Facilitators:  Estelle Rangel  _____________________________________________________________________    Patient Name: Jesus Manuel Lucas  MRN: [id number]  Patient Class: IP- Psych   Admission Date\Time: 4/8/2025 10:04 AM  Hospital Length of Stay: 3  Primary Care Provider: Rachael, Primary Doctor     Referred by: Acute Psychiatry Unit Treatment Team     Target symptoms: Depression     Patient's response to treatment: Active Listening     Progress toward goals: Progressing well     Interval History:      Diagnosis:      Plan: Pt is being discharged tomorrow

## 2025-04-11 NOTE — PROGRESS NOTES
"4/11/2025  Jesus Manuel Lucas   2000   [id number]        Psychiatry Progress Note     Chief Complaint: "Edy"    SUBJECTIVE:   Jesus Manuel Lucas is a 24 y.o. male placed under a Physician's Emergency Certificate at [facility] due to suicidal ideation.     The patient was seen today and described his mood as great. He remains off medications and continues to deny any need for psychotropic treatment. He remains calm, cooperative, and demonstrating appropriate affect throughout the interaction. There were no overt signs of psychosis, agitation, or mood instability during the evaluation.    The patient remains psychiatrically stable, with no acute safety concerns or evidence of decompensation. He has remained behaviorally appropriate and is maintaining goal-directed thought processes. Although not on medication, he is functioning well in the current structured setting and demonstrates positive engagement when discussing future plans. Will plan for discharge home tomorrow.       Current Medications:   Scheduled Meds:    nicotine  1 patch Transdermal Daily      PRN Meds:   Current Facility-Administered Medications:     acetaminophen, 650 mg, Oral, Q6H PRN    aluminum-magnesium hydroxide-simethicone, 30 mL, Oral, Q6H PRN    haloperidoL, 5 mg, Oral, Q6H PRN **AND** diphenhydrAMINE, 50 mg, Oral, Q6H PRN **AND** LORazepam, 2 mg, Oral, Q6H PRN **AND** haloperidol lactate, 5 mg, Intramuscular, Q6H PRN **AND** diphenhydrAMINE, 50 mg, Intramuscular, Q6H PRN **AND** lorazepam, 2 mg, Intramuscular, Q6H PRN    hydrOXYzine HCL, 50 mg, Oral, Q4H PRN    traZODone, 100 mg, Oral, Nightly PRN   Psychotherapeutics (From admission, onward)      Start     Stop Route Frequency Ordered    04/08/25 1008  haloperidoL tablet 5 mg  (Med - Acute  Behavioral Management)        Placed in "And" Linked Group    -- Oral Every 6 hours PRN 04/08/25 1008    04/08/25 1008  LORazepam tablet 2 mg  (Med - Acute  Behavioral Management)        Placed in "And" Linked Group    " "-- Oral Every 6 hours PRN 04/08/25 1008    04/08/25 1008  haloperidol lactate injection 5 mg  (Med - Acute  Behavioral Management)        Placed in "And" Linked Group    -- IM Every 6 hours PRN 04/08/25 1008    04/08/25 1008  LORazepam injection 2 mg  (Med - Acute  Behavioral Management)        Placed in "And" Linked Group    -- IM Every 6 hours PRN 04/08/25 1008    04/08/25 1008  traZODone tablet 100 mg         -- Oral Nightly PRN 04/08/25 1008            Allergies:   Review of patient's allergies indicates:  No Known Allergies     OBJECTIVE:   Vitals   Vitals:    04/10/25 1600   BP: 133/88   Pulse: 69   Resp:    Temp: 97.2 °F (36.2 °C)        Labs/Imaging/Studies:   No results found for this or any previous visit (from the past 36 hours).         Medical Review Of Systems:  A comprehensive review of systems was negative.      Psychiatric Mental Status Exam:  General Appearance: appears stated age, well-developed, well-nourished  Arousal: alert  Behavior: cooperative  Movements and Motor Activity: no abnormal involuntary movements noted  Orientation: oriented to person, place, time, and situation  Speech: normal rate, normal rhythm, normal volume, normal tone  Mood: "Great"  Affect: constricted  Thought Process: linear  Associations: intact  Thought Content and Perceptions: denies suicidal ideation, no homicidal ideation, no auditory hallucinations, no visual hallucinations, no paranoid ideation, no ideas of reference  Recent and Remote Memory: recent memory intact, remote memory intact; per interview/observation with patient  Attention and Concentration: intact, attentive to conversation; per interview/observation with patient  Fund of Knowledge: intact, aware of current events, vocabulary appropriate; based on history, vocabulary, fund of knowledge, syntax, grammar, and content  Insight: questionable; based on understanding of severity of illness and HPI  Judgment: questionable; based on patient's behavior and " HPI    ASSESSMENT/PLAN:   Problems Addressed/Diagnoses:  Depression (F33.9)  Cannabis use disorder (F12.20)     History reviewed. No pertinent past medical history.     Plan:  Depression, chronic with acute exacerbation  -Patient deferring medication    Expected Disposition Plan: Home        Joseph Moran PMARACELISP-BC

## 2025-04-11 NOTE — PROGRESS NOTES
04/11/25 1000   [facility] Group Therapy   Group Name Therapeutic Recreation   Specific Interventions Skilled Activity Mild Exercises   Participation Level Active;Supportive;Appropriate;Attentive;Sharing   Participation Quality Cooperative;Social   Insight/Motivation Improved;Applies New Skills   Affect/Mood Display Appropriate;Bright   Cognition Oriented;Alert   Psychomotor WNL

## 2025-04-11 NOTE — PLAN OF CARE
Problem: Adult Behavioral Health Plan of Care  Goal: Plan of Care Review  Outcome: Progressing  Flowsheets (Taken 4/10/2025 2038)  Patient Agreement with Plan of Care: agrees  Plan of Care Reviewed With: patient  Goal: Patient-Specific Goal (Individualization)  Outcome: Progressing  Flowsheets (Taken 4/10/2025 2038)  Patient Personal Strengths: independent living skills  Patient Vulnerabilities: family/relationship conflict  Goal: Adheres to Safety Considerations for Self and Others  Outcome: Progressing  Flowsheets (Taken 4/10/2025 2038)  Adheres to Safety Considerations for Self and Others: making progress toward outcome  Intervention: Develop and Maintain Individualized Safety Plan  Flowsheets (Taken 4/10/2025 2038)  Safety Measures:   safety plan reviewed   safety rounds completed  Goal: Optimized Coping Skills in Response to Life Stressors  Outcome: Progressing  Flowsheets (Taken 4/10/2025 2038)  Optimized Coping Skills in Response to Life Stressors: making progress toward outcome  Intervention: Promote Effective Coping Strategies  Flowsheets (Taken 4/10/2025 2038)  Supportive Measures:   active listening utilized   verbalization of feelings encouraged   self-care encouraged  Goal: Develops/Participates in Therapeutic [place] to Support Successful Transition  Outcome: Progressing  Flowsheets (Taken 4/10/2025 2038)  Develops/Participates in Therapeutic [place] to Support Successful Transition: making progress toward outcome  Intervention: Foster Therapeutic [place]  Flowsheets (Taken 4/10/2025 2038)  Trust Relationship/Rapport:   care explained   questions encouraged   reassurance provided  Goal: Rounds/Family Conference  Outcome: Progressing  Flowsheets (Taken 4/10/2025 2038)  Participants:   milieu/psych techs   nursing     Problem: Violence Risk or Actual  Goal: Anger and Impulse Control  Outcome: Progressing  Intervention: Minimize Safety Risk  Flowsheets (Taken 4/10/2025 2038)  Behavior Management:  behavioral plan reviewed  Sensory Stimulation Regulation: quiet environment promoted  De-Escalation Techniques: increased round frequency  Enhanced Safety Measures: monitored by video  Intervention: Promote Self-Control  Flowsheets (Taken 4/10/2025 2038)  Supportive Measures:   active listening utilized   verbalization of feelings encouraged   self-care encouraged  Environmental Support: calm environment promoted     Problem: Depressive Signs/Symptoms  Goal: Optimized Energy Level (Depressive Signs/Symptoms)  Outcome: Progressing  Flowsheets (Taken 4/10/2025 2038)  Mutually Determined Action Steps (Optimized Energy Level): grooms self without prompting  Intervention: Optimize Energy Level  Flowsheets (Taken 4/10/2025 2038)  Activity (Behavioral Health): up ad zayda  Patient Performed Hygiene: teeth brushed  Diversional Activity: television  Goal: Optimized Cognitive Function (Depressive Signs/Symptoms)  Outcome: Progressing  Flowsheets (Taken 4/10/2025 2038)  Mutually Determined Action Steps (Optimized Cognitive Function): participates in attention training  Goal: Increased Participation and Engagement (Depressive Signs/Symptoms)  Outcome: Progressing  Flowsheets (Taken 4/10/2025 2038)  Mutually Determined Action Steps (Increased Participation and Engagement): participates in one or more activity  Intervention: Facilitate Participation and Engagement  Flowsheets (Taken 4/10/2025 2038)  Supportive Measures:   active listening utilized   verbalization of feelings encouraged   self-care encouraged  Diversional Activity: television  Goal: Enhanced Self-Esteem and Confidence (Depressive Signs/Symptoms)  Outcome: Progressing  Flowsheets (Taken 4/10/2025 2038)  Mutually Determined Action Steps (Enhanced Self-Esteem and Confidence): verbalizes successes  Intervention: Promote Confidence and Self-Esteem  Flowsheets (Taken 4/10/2025 2038)  Supportive Measures:   active listening utilized   verbalization of feelings encouraged    self-care encouraged  Goal: Improved Mood Symptoms (Depressive Signs/Symptoms)  Outcome: Progressing  Flowsheets (Taken 4/10/2025 2038)  Mutually Determined Action Steps (Improved Mood Symptoms): acknowledges progress  Intervention: Promote Mood Improvement  Flowsheets (Taken 4/10/2025 2038)  Supportive Measures:   active listening utilized   verbalization of feelings encouraged   self-care encouraged  Diversional Activity: television  Goal: Optimized Nutrition Intake (Depressive Signs/Symptoms)  Outcome: Progressing  Flowsheets (Taken 4/10/2025 2038)  Mutually Determined Action Steps (Optimized Nutrition Intake): eats at meal time  Intervention: Promote Optimal Nutrition Intake  Flowsheets (Taken 4/10/2025 2038)  Bowel Elimination Promotion:   adequate fluid intake promoted   ambulation promoted  Goal: Improved Psychomotor Symptoms (Depressive Signs/Symptoms)  Outcome: Progressing  Flowsheets (Taken 4/10/2025 2038)  Mutually Determined Action Steps (Improved Psychomotor Symptoms): adheres to medication regimen  Intervention: Manage Psychomotor Movement  Flowsheets (Taken 4/10/2025 2038)  Activity (Behavioral Health): up ad zayda  Patient Performed Hygiene: teeth brushed  Diversional Activity: television  Goal: Improved Sleep (Depressive Signs/Symptoms)  Outcome: Progressing  Flowsheets (Taken 4/10/2025 2038)  Mutually Determined Action Steps (Improved Sleep): sleeps 4-6 hours at night  Intervention: Promote Healthy Sleep Hygiene  Flowsheets (Taken 4/10/2025 2038)  Sleep Hygiene Promotion:   awakenings minimized   regular sleep pattern promoted  Goal: Enhanced Social, Occupational or Functional Skills (Depressive Signs/Symptoms)  Outcome: Progressing  Flowsheets (Taken 4/10/2025 2038)  Mutually Determined Action Steps (Enhanced Social, Occupational or Functional Skills): participates in social skills training  Intervention: Promote Social, Occupational and Functional Ability  Flowsheets (Taken 4/10/2025 2038)  Social  Functional Ability Promotion:   autonomy promoted   self-expression encouraged   social interaction promoted     AAOx4 Pt denies SI/HI/AVH. Pt denies having anxiety and depression, reports good sleep and good appetite. Pt has good eye contact and affect is appropriate. Q15 min safety checks continued.

## 2025-04-11 NOTE — PLAN OF CARE
Behavioral Health Unit  Psychosocial History and Assessment  Progress Note      Patient Name: Jesus Manuel Lucas YOB: 2000 SW: Estelle Rangel Date: 4/11/2025    Chief Complaint: mood elevation    Consent:     Did the patient consent for an interview with the ? Yes    Did the patient consent for the  to contact family/friend/caregiver?   Yes  Name: Jonh Aly, Relationship: grandmother, and Contact: [phone]    Did the patient give consent for the  to inform family/friend/caregiver of his/her whereabouts or to discuss discharge planning? Yes    Source of Information: Face to face with patient    Is information obtained from interviews considered reliable?   yes    Reason for Admission:     Active Hospital Problems    Diagnosis  POA    *Recurrent major depression [F33.9]  Yes    Cannabis dependence, continuous [F12.20]  Yes      Resolved Hospital Problems   No resolved problems to display.       History of Present Illness - (Patient Perception):   I'm in a toxic relationship with a  and we got into it again. She said she was going to cause hell in I'm my life so I repeated the lines from a song we recently heard recently that said if I give up, I got a bullet with your name on it.  She left and I went to sleep and woke u to the  telling me I had to go to the hospital    Present biopsychosocial functioning: calm, cooperative    Past biopsychosocial functioning: hx of higher functioning    Family and Marital/Relationship History:     Significant Other/Partner Relationships:  Partnered: 6 years; 3 children    Family Relationships: Decent      Childhood History:     Where was patient raised? [place] [address] [place]    Who raised the patient? Mother and grand mother      How does patient describe their childhood? normal      Who is patient's primary support person? Friend group, grandmother      Culture and Hindu:     Hindu:  non-Uatsdin    How strong of a role does Gnosticist and spirituality play in patient's life? stong    Sikhism or spiritual concerns regarding treatment: observation of holy days  and spiritual concerns / distress    History of Abuse:   History of Abuse: Denies      Outcome: denies    Psychiatric and Medical History:     History of psychiatric illness or treatment: has participated in counseling/psychotherapy on an outpatient basis in the past    Medical history: History reviewed. No pertinent past medical history.    Substance Abuse History:     Alcohol - (Patient Perspective): Pt states that he does not drink  Social History     Substance and Sexual Activity   Alcohol Use None       Drugs - (Patient Perspective): Pt states that he smokes marijuana socially  Social History     Substance and Sexual Activity   Drug Use Yes    Frequency: 7.0 times per week    Types: Marijuana         Education:     Currently Enrolled? No  Pt states that he completed 2 years of college in [employer]    Special Education? No    Interested in Completing Education/GED: No    Employment and Financial:     Currently employed? Employed: Current Occupation: pt states that he works off [employer] for [employer]    Source of Income: salary    Able to afford basic needs (food, shelter, utilities)? Yes    Who manages finances/personal affairs? self      Service:     [place]? no    Combat Service? No     Community Resources:     Describe present use of community resources: inpatient bh     Identify previously used community resources   (Include previous mental health treatment - outpatient and inpatient): previous counseling services    Environmental:     Current living situation:Lives with spouse    Social Evaluation:     Patient Assets: General fund of knowledge, Supportive family/friends, Motivation for treatment/growth, Capable of independent living, Work skills, and Sikhism affliation    Patient Limitations: poor coping  skils    High risk psychosocial issues that may impact discharge planning:   None at this time    Recommendations:     Anticipated discharge plan:   outpatient follow up  [facility] resources needed for discharge planning:  aftercare treatment sources    Anticipated social work role(s) in treatment and discharge planning: advice and [place], groups, individual as needed and referral to aftercare.    04/09/25 0800   Initial Information   Source of Information patient   Reason for Admission behavioral   Patient Aware of Diagnosis yes   Arrived From emergency department   Spiritual Beliefs   Spiritual, Cultural Beliefs, Episcopalian Practices, Values that Affect Care yes   Description of Beliefs that Will Affect Care Non Christian   Substance Use/Withdrawal   Substance Use Social or intermittent use   Additional Tobacco Use   How many cigarettes do you typically have per day? 2   Abuse Screen (yes response referral indicated)   Feels Unsafe at Home or Work/School no   Feels Threatened by Someone no   Does anyone try to keep you from having contact with others or doing things outside your home? no   Physical Signs of Abuse Present no   Abuse Details   Physical Abuse No   Sexual Abuse No   Emotional Abuse No   AUDIT-C (Alcohol Use Disorders ID Test)   Alcohol Use In Past Year 0-->never   Alcohol Amount Per Day In Past Year 0-->none   More Than 6 Drinks On One Occasion In Past Year 0-->never   Total Audit C Score 0

## 2025-04-11 NOTE — NURSING
2007 pt c/o restlessness. Administered PRN trazodone 100 mg at this time.     2040 pt noted to be resting quietly in bed with eyes closed. Respirations even/unlabored.

## 2025-04-11 NOTE — PLAN OF CARE
Problem: Adult Behavioral Health Plan of Care  Goal: Plan of Care Review  Outcome: Progressing  Flowsheets (Taken 4/11/2025 1043)  Patient Agreement with Plan of Care: agrees  Plan of Care Reviewed With: patient  Goal: Patient-Specific Goal (Individualization)  Outcome: Progressing  Flowsheets (Taken 4/11/2025 1043)  Patient Personal Strengths: independent living skills  Patient Vulnerabilities: family/relationship conflict  Goal: Adheres to Safety Considerations for Self and Others  Outcome: Progressing  Flowsheets (Taken 4/11/2025 1043)  Adheres to Safety Considerations for Self and Others: making progress toward outcome  Intervention: Develop and Maintain Individualized Safety Plan  Flowsheets (Taken 4/11/2025 1043)  Safety Measures: safety rounds completed  Goal: Optimized Coping Skills in Response to Life Stressors  Outcome: Progressing  Flowsheets (Taken 4/11/2025 1043)  Optimized Coping Skills in Response to Life Stressors: making progress toward outcome  Intervention: Promote Effective Coping Strategies  Flowsheets (Taken 4/11/2025 1043)  Supportive Measures: self-care encouraged  Goal: Develops/Participates in Therapeutic [place] to Support Successful Transition  Outcome: Progressing  Flowsheets (Taken 4/11/2025 1043)  Develops/Participates in Therapeutic [place] to Support Successful Transition: making progress toward outcome  Intervention: Foster Therapeutic [place]  Flowsheets (Taken 4/11/2025 1043)  Trust Relationship/Rapport: care explained  Goal: Rounds/Family Conference  Outcome: Progressing  Flowsheets (Taken 4/11/2025 1043)  Participants:   milieu/psych techs   nursing     Problem: Violence Risk or Actual  Goal: Anger and Impulse Control  Outcome: Progressing  Intervention: Minimize Safety Risk  Flowsheets (Taken 4/11/2025 1043)  Behavior Management: behavioral plan reviewed  Sensory Stimulation Regulation: quiet environment promoted  De-Escalation Techniques: quiet time  facilitated  Intervention: Promote Self-Control  Flowsheets (Taken 4/11/2025 1043)  Supportive Measures: self-care encouraged  Environmental Support: calm environment promoted     Problem: Depressive Signs/Symptoms  Goal: Optimized Energy Level (Depressive Signs/Symptoms)  Outcome: Progressing  Flowsheets (Taken 4/11/2025 1043)  Mutually Determined Action Steps (Optimized Energy Level): grooms self without prompting  Intervention: Optimize Energy Level  Flowsheets (Taken 4/11/2025 1043)  Activity (Behavioral Health): up ad zayda  Patient Performed Hygiene: teeth brushed  Diversional Activity: television  Goal: Optimized Cognitive Function (Depressive Signs/Symptoms)  Outcome: Progressing  Flowsheets (Taken 4/11/2025 1043)  Mutually Determined Action Steps (Optimized Cognitive Function): participates in attention training  Goal: Increased Participation and Engagement (Depressive Signs/Symptoms)  Outcome: Progressing  Flowsheets (Taken 4/11/2025 1043)  Mutually Determined Action Steps (Increased Participation and Engagement): participates in one or more activity  Intervention: Facilitate Participation and Engagement  Flowsheets (Taken 4/11/2025 1043)  Supportive Measures: self-care encouraged  Diversional Activity: television  Goal: Enhanced Self-Esteem and Confidence (Depressive Signs/Symptoms)  Outcome: Progressing  Flowsheets (Taken 4/11/2025 1043)  Mutually Determined Action Steps (Enhanced Self-Esteem and Confidence): verbalizes successes  Intervention: Promote Confidence and Self-Esteem  Flowsheets (Taken 4/11/2025 1043)  Supportive Measures: self-care encouraged  Goal: Improved Mood Symptoms (Depressive Signs/Symptoms)  Outcome: Progressing  Flowsheets (Taken 4/11/2025 1043)  Mutually Determined Action Steps (Improved Mood Symptoms): acknowledges progress  Intervention: Promote Mood Improvement  Flowsheets (Taken 4/11/2025 1043)  Supportive Measures: self-care encouraged  Diversional Activity: television  Goal:  Optimized Nutrition Intake (Depressive Signs/Symptoms)  Outcome: Progressing  Flowsheets (Taken 4/11/2025 1043)  Mutually Determined Action Steps (Optimized Nutrition Intake): eats at meal time  Intervention: Promote Optimal Nutrition Intake  Flowsheets (Taken 4/11/2025 1043)  Nutrition Interventions: food preferences provided  Bowel Elimination Promotion: adequate fluid intake promoted  Goal: Improved Psychomotor Symptoms (Depressive Signs/Symptoms)  Outcome: Progressing  Flowsheets (Taken 4/11/2025 1043)  Mutually Determined Action Steps (Improved Psychomotor Symptoms): adheres to medication regimen  Intervention: Manage Psychomotor Movement  Flowsheets (Taken 4/11/2025 1043)  Activity (Behavioral Health): up ad zayda  Patient Performed Hygiene: teeth brushed  Diversional Activity: television  Goal: Improved Sleep (Depressive Signs/Symptoms)  Outcome: Progressing  Flowsheets (Taken 4/11/2025 1043)  Mutually Determined Action Steps (Improved Sleep): sleeps 4-6 hours at night  Intervention: Promote Healthy Sleep Hygiene  Flowsheets (Taken 4/11/2025 1043)  Sleep Hygiene Promotion: regular sleep pattern promoted  Goal: Enhanced Social, Occupational or Functional Skills (Depressive Signs/Symptoms)  Outcome: Progressing  Flowsheets (Taken 4/11/2025 1043)  Mutually Determined Action Steps (Enhanced Social, Occupational or Functional Skills): participates in social skills training  Intervention: Promote Social, Occupational and Functional Ability  Flowsheets (Taken 4/11/2025 1043)  Social Functional Ability Promotion: autonomy promoted    At present time, patient denies SI, HI, hallucinations, and delusions. Speech normal tone and speed. Good eye contact noted. Interacts with selected patients and staff. No complaints noted. Cooperative. AAO X 4. Continue plan of care and provide a safe and therapeutic environment. Continue to monitor every fifteen minutes for safety.

## 2025-04-12 VITALS
SYSTOLIC BLOOD PRESSURE: 121 MMHG | HEIGHT: 71 IN | WEIGHT: 215 LBS | TEMPERATURE: 98 F | RESPIRATION RATE: 18 BRPM | OXYGEN SATURATION: 98 % | DIASTOLIC BLOOD PRESSURE: 79 MMHG | BODY MASS INDEX: 30.1 KG/M2 | HEART RATE: 64 BPM

## 2025-04-12 NOTE — PLAN OF CARE
Problem: Adult Behavioral Health Plan of Care  Goal: Plan of Care Review  Outcome: Met  Goal: Patient-Specific Goal (Individualization)  Outcome: Met  Goal: Adheres to Safety Considerations for Self and Others  Outcome: Met  Goal: Optimized Coping Skills in Response to Life Stressors  Outcome: Met  Goal: Develops/Participates in Therapeutic [place] to Support Successful Transition  Outcome: Met  Goal: Rounds/Family Conference  Outcome: Met     Problem: Violence Risk or Actual  Goal: Anger and Impulse Control  Outcome: Met     Problem: Depressive Signs/Symptoms  Goal: Optimized Energy Level (Depressive Signs/Symptoms)  Outcome: Met  Goal: Optimized Cognitive Function (Depressive Signs/Symptoms)  Outcome: Met  Goal: Increased Participation and Engagement (Depressive Signs/Symptoms)  Outcome: Met  Goal: Enhanced Self-Esteem and Confidence (Depressive Signs/Symptoms)  Outcome: Met  Goal: Improved Mood Symptoms (Depressive Signs/Symptoms)  Outcome: Met  Goal: Optimized Nutrition Intake (Depressive Signs/Symptoms)  Outcome: Met  Goal: Improved Psychomotor Symptoms (Depressive Signs/Symptoms)  Outcome: Met  Goal: Improved Sleep (Depressive Signs/Symptoms)  Outcome: Met  Goal: Enhanced Social, Occupational or Functional Skills (Depressive Signs/Symptoms)  Outcome: Met     AAOx4 Pt denies SI/HI/AVH. Pt denies having anxiety and depression, reports good sleep and good appetite. Pt has good eye contact and affect is appropriate. Q15 min safety checks continued.

## 2025-04-12 NOTE — NURSING
Patient is a discharge today.No medications ordered for discharge. Patient has given going home instructions  and follow up appointments and stated understanding. Also educated about suicide hotlines  and smoking education education,stated understanding.Denies SI and HI ,denies auditory and visual hallucinations.denies anxiety and depression.

## 2025-04-12 NOTE — NURSING
Patient is discharged per DR Smith  today. Denies  SI and HI . Patient is escorted by  and Mental Health Technician out the facility and will be going home with brother via family car.

## 2025-04-12 NOTE — NURSING
2015 pt c/o restlessness. Administered PRN trazodone at this time.     2045 pt noted to be resting quietly in bed with eyes closed. Respirations even/unlabored.
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
